# Patient Record
Sex: FEMALE | Race: WHITE | Employment: OTHER
[De-identification: names, ages, dates, MRNs, and addresses within clinical notes are randomized per-mention and may not be internally consistent; named-entity substitution may affect disease eponyms.]

---

## 2020-07-31 ENCOUNTER — TELEPHONE (OUTPATIENT)
Dept: CASE MANAGEMENT | Age: 65
End: 2020-07-31

## 2020-07-31 VITALS — WEIGHT: 165 LBS | HEIGHT: 67 IN | BODY MASS INDEX: 25.9 KG/M2

## 2020-07-31 NOTE — TELEPHONE ENCOUNTER
I called the patient and she confirmed her CT lung screening at [facility] on 8/3/2020 at 12:00 pm.  I reminded the patient to arrive at 11:30 am, enter through the main entrance, and register. Patient confirmed.           Electronically signed by Dominic Rosado on 7/31/20 at 2:34 PM EDT

## 2020-07-31 NOTE — TELEPHONE ENCOUNTER
No call, encounter opened just updating patient's height and weight.            Electronically signed by Lindy Salguero on 7/31/20 at 2:40 PM EDT

## 2020-08-03 ENCOUNTER — HOSPITAL ENCOUNTER (OUTPATIENT)
Dept: CT IMAGING | Age: 65
Discharge: HOME | End: 2020-08-05
Payer: MEDICARE

## 2020-08-03 PROCEDURE — G0297 LDCT FOR LUNG CA SCREEN: HCPCS

## 2020-08-04 ENCOUNTER — TELEPHONE (OUTPATIENT)
Dept: CASE MANAGEMENT | Age: 65
End: 2020-08-04

## 2020-08-04 NOTE — TELEPHONE ENCOUNTER
No call, encounter opened to process CT Lung Screening. CT Lung Screen: 8/3/2020  Impression    6.5 mm subpleural nodular opacity in the right upper lobe.         Lung RADS score: 3. (Probably benign)    RECOMMENDATION: Follow up with LDCT in 6 months. .      Pack years: 47    Social History     Tobacco Use  Smoking Status: Former Smoker    Start Date: 1972   Quit Date: 2008   Types: Cigarettes   Packs/Day: 1.50   Years: 36.00   Pack Years: 47   Smokeless Tobacco: Never Used         Results letter sent to patient via my chart or mailed.      [facility]

## 2020-08-24 ENCOUNTER — OFFICE VISIT (OUTPATIENT)
Dept: PHYSICAL MEDICINE AND REHAB | Age: 65
End: 2020-08-24
Payer: MEDICARE

## 2020-08-24 VITALS
HEIGHT: 67 IN | WEIGHT: 163 LBS | SYSTOLIC BLOOD PRESSURE: 106 MMHG | DIASTOLIC BLOOD PRESSURE: 68 MMHG | HEART RATE: 61 BPM | BODY MASS INDEX: 25.58 KG/M2

## 2020-08-24 PROCEDURE — 99204 OFFICE O/P NEW MOD 45 MIN: CPT | Performed by: PHYSICAL MEDICINE & REHABILITATION

## 2020-08-24 PROCEDURE — 3017F COLORECTAL CA SCREEN DOC REV: CPT | Performed by: PHYSICAL MEDICINE & REHABILITATION

## 2020-08-24 PROCEDURE — 4040F PNEUMOC VAC/ADMIN/RCVD: CPT | Performed by: PHYSICAL MEDICINE & REHABILITATION

## 2020-08-24 PROCEDURE — G8417 CALC BMI ABV UP PARAM F/U: HCPCS | Performed by: PHYSICAL MEDICINE & REHABILITATION

## 2020-08-24 PROCEDURE — 1123F ACP DISCUSS/DSCN MKR DOCD: CPT | Performed by: PHYSICAL MEDICINE & REHABILITATION

## 2020-08-24 PROCEDURE — 1090F PRES/ABSN URINE INCON ASSESS: CPT | Performed by: PHYSICAL MEDICINE & REHABILITATION

## 2020-08-24 PROCEDURE — G8427 DOCREV CUR MEDS BY ELIG CLIN: HCPCS | Performed by: PHYSICAL MEDICINE & REHABILITATION

## 2020-08-24 PROCEDURE — 1036F TOBACCO NON-USER: CPT | Performed by: PHYSICAL MEDICINE & REHABILITATION

## 2020-08-24 PROCEDURE — G8400 PT W/DXA NO RESULTS DOC: HCPCS | Performed by: PHYSICAL MEDICINE & REHABILITATION

## 2020-08-24 RX ORDER — GABAPENTIN 100 MG/1
100 CAPSULE ORAL 3 TIMES DAILY
COMMUNITY
Start: 2020-08-18

## 2020-08-24 RX ORDER — TRAZODONE HYDROCHLORIDE 100 MG/1
TABLET ORAL
COMMUNITY
Start: 2020-07-20

## 2020-08-24 RX ORDER — DULOXETIN HYDROCHLORIDE 60 MG/1
120 CAPSULE, DELAYED RELEASE ORAL DAILY
COMMUNITY
Start: 2020-07-20

## 2020-08-24 NOTE — PROGRESS NOTES
Nicciana Garza, [address] [facility] Physical Medicine and Rehabilitation  [address] [facility]. [facility] [facility] [place]  Phone: [phone]  Fax: [phone]    PCP: Papi Garcia DO  Date of visit: 8/24/20    Chief Complaint   Patient presents with    Lower Back Pain     New Patient    Leg Pain       Dear Dr. Jt Brown,     Thank you for referring your patient to be seen. As you know,  Renata Echeverria is a 71 y.o. female with past medical history as below who presents with bilateral leg pain and back pain for years, worse recently. There was a gradual onset of pain after no known injury. Now, the pain is intermittent and occurs daily while walking. The pain is rated Pain Score:   5, is described as achy, deep, and is located in bilateral calves. She also has low back pain that is radiating into the left thigh. Described as \"sciatic-like\" pain. Equates it to pain she had prior to her back surgery. The symptoms have been worse since onset. The pain is better with rest.  The pain is worse with walking. There is no associated numbness/tingling. There is weakness. There is no bowel/bladder changes. +poor balance. +near falls and tripping. The prior workup has included: none      The prior treatment has included:  PT: none   Chiropractic: none    Modalities: none   OTC Tylenol: none    NSAIDS: none   Opioids: none    Membrane stabilizers: neurontin 200mg PRN   cymbalta   Muscle relaxers: none    Previous injections: none    Previous surgery at this site: lumbar fusion- 2-3 years ago in [place]     No Known Allergies    Current Outpatient Medications   Medication Sig Dispense Refill    DULoxetine (CYMBALTA) 60 MG extended release capsule Take 120 mg by mouth daily      gabapentin (NEURONTIN) 100 MG capsule Take 100 mg by mouth 3 times daily.  traZODone (DESYREL) 100 MG tablet TAKE 1 TO 2 TABLETS BY MOUTH AT BEDTIME       No current facility-administered medications for this visit.         Past Medical History:   Diagnosis Date    Fibromyalgia        Past Surgical History:   Procedure Laterality Date    LUMBAR SPINE SURGERY         History reviewed. No pertinent family history. Social History     Tobacco Use    Smoking status: Former Smoker     Packs/day: 1.50     Years: 36.00     Pack years: 54.00     Types: Cigarettes     Start date:      Last attempt to quit: 2008     Years since quittin.6    Smokeless tobacco: Never Used   Substance Use Topics    Alcohol use: Not on file    Drug use: Not on file          Functional Status: The patient is able to ambulate and perform activities of daily living without the use of an assistive device. Occupation: The patient is currently shows dogs. ROS: For more complete ROS answered by the patient, please see . Constitutional: Denies fevers, chills, night sweats, unintentional weight loss     Skin: Denies rash or skin changes     Eyes: Denies vision changes    Ears/Nose/Throat: Denies nasal congestion or sore throat     Respiratory: Denies SOB or cough     Cardiovascular: Denies CP, palpitations, edema      Gastrointestinal: Denies abdominal pain,  N/V, constipation, or diarrhea    Genitourinary: Denies urinary symptoms    Neurologic: See HPI.     MSK: See HPI. Psychiatric: Denies sleep disturbance, anxiety, depression    Hematologic/Lymphatic/Immunologic: Denies bruising       Physical Exam:   Blood pressure 106/68, pulse 61, height 5' 7\" (1.702 m), weight 163 lb (73.9 kg). General: well developed and well nourished in no acute distress. Body habitus is non obese  HEENT: No rhinorrhea, sneezing, yawning, or lacrimation. No scleral icterus or conjunctival injection. Resp: symmetrical chest expansion, unlabored breathing, respirations unlabored. CV: Heart rate is regular. Peripheral pulses are palpable  Lymph: No visible regional lymphadenopathy. Skin: No rashes or ecchymosis. Normal turgor. Psych: Mood is calm. Affect is normal.   Ext: No edema noted     MSK:   Back/Hip Exam:   Inspection: Pelvis was asymmetric. Lumbar lordotic curvature was decreased. There was no scoliosis. No ecchymoses or erythema. Palpation: Palpatory exam revealed tenderness along lumbosacral paraspinals, midline spine, no ttp SI joint sulcus, greater trochanters and TFL on both side. There was paraspinal spasms. There were no trigger points. ROM: ROM decreased  Special/provocative testing:   SLR negative. No calf tenderness. Neurological Exam:  Strength:   R  L  Hip Flex  5  5  Knee Ext  4+ 4+  Ankle dorsi  4+ 4+  EHL   4+ 4+  Ankle Plantar  4+ 4+    Sensory:  Intact for light touch in all lower extremity dermatomes. Reflexes:   R  L  Patellar  (0) (0)  Ankle Jerk  (0) (0)        Gait is Antalgic. Unable to tandem gait. Difficulty with heel and toe walking. Imaging: (personally reviewed by me 08/24/20)  None     Impression:   Justo Avelar is a 71 y.o. female     1. Chronic bilateral low back pain with left-sided sciatica    2. Lumbar stenosis with neurogenic claudication    3. Radiculopathy, lumbosacral region        Plan:   · Lumbar x-ray and MRI L spine- history of back surgery, weakness. · Refer to PT. · Take neurontin TID not PRN.  The patient was educated about the diagnosis, prognosis, indications, risks and benefits of treatment. An opportunity to ask questions was given to the patient and questions were answered. The patient agreed to proceed with the recommended treatment as described above.  Follow up after PT/MRI. Discussed treatment options in future if needed included injections. Thank you for the consultation and for allowing me to participate in the care of this patient.         Sincerely,     Waldo Mcardle, DO, [facility]   Board Certified Physical Medicine and Rehabilitation

## 2020-09-05 ENCOUNTER — HOSPITAL ENCOUNTER (OUTPATIENT)
Dept: MRI IMAGING | Age: 65
Discharge: HOME | End: 2020-09-07
Payer: MEDICARE

## 2020-09-05 PROCEDURE — 72148 MRI LUMBAR SPINE W/O DYE: CPT

## 2020-09-08 ENCOUNTER — TELEPHONE (OUTPATIENT)
Dept: PHYSICAL MEDICINE AND REHAB | Age: 65
End: 2020-09-08

## 2020-09-08 NOTE — TELEPHONE ENCOUNTER
Called and left message on patient voicemail to call office back to schedule an appointment to go over MRI results.

## 2020-09-08 NOTE — TELEPHONE ENCOUNTER
----- Message from Mojgan Alberto DO sent at 9/8/2020  9:02 AM EDT -----  Please call patient to schedule appointment to review MRI. Nothing urgent.

## 2020-09-21 ENCOUNTER — OFFICE VISIT (OUTPATIENT)
Dept: PHYSICAL MEDICINE AND REHAB | Age: 65
End: 2020-09-21
Payer: MEDICARE

## 2020-09-21 VITALS
WEIGHT: 165 LBS | HEIGHT: 67 IN | HEART RATE: 72 BPM | SYSTOLIC BLOOD PRESSURE: 119 MMHG | DIASTOLIC BLOOD PRESSURE: 75 MMHG | BODY MASS INDEX: 25.9 KG/M2

## 2020-09-21 PROCEDURE — 99214 OFFICE O/P EST MOD 30 MIN: CPT | Performed by: PHYSICAL MEDICINE & REHABILITATION

## 2020-09-21 PROCEDURE — 3017F COLORECTAL CA SCREEN DOC REV: CPT | Performed by: PHYSICAL MEDICINE & REHABILITATION

## 2020-09-21 PROCEDURE — 4040F PNEUMOC VAC/ADMIN/RCVD: CPT | Performed by: PHYSICAL MEDICINE & REHABILITATION

## 2020-09-21 PROCEDURE — G8417 CALC BMI ABV UP PARAM F/U: HCPCS | Performed by: PHYSICAL MEDICINE & REHABILITATION

## 2020-09-21 PROCEDURE — G8400 PT W/DXA NO RESULTS DOC: HCPCS | Performed by: PHYSICAL MEDICINE & REHABILITATION

## 2020-09-21 PROCEDURE — 1123F ACP DISCUSS/DSCN MKR DOCD: CPT | Performed by: PHYSICAL MEDICINE & REHABILITATION

## 2020-09-21 PROCEDURE — 1036F TOBACCO NON-USER: CPT | Performed by: PHYSICAL MEDICINE & REHABILITATION

## 2020-09-21 PROCEDURE — G8427 DOCREV CUR MEDS BY ELIG CLIN: HCPCS | Performed by: PHYSICAL MEDICINE & REHABILITATION

## 2020-09-21 PROCEDURE — 1090F PRES/ABSN URINE INCON ASSESS: CPT | Performed by: PHYSICAL MEDICINE & REHABILITATION

## 2020-09-21 NOTE — PROGRESS NOTES
James Amos, [address] [facility] Physical Medicine and Rehabilitation  [address] [facility] [place]  Phone: [phone]  Fax: [phone]    PCP: Sarai Weinstein DO  Date of visit: 9/21/20    Chief Complaint   Patient presents with    Back Pain     follow up and results MRI     Interval:   Patient presents today for MRI results. MRI reveals moderate stenosis at L4-5, otherwise rather unremarkable to explain her severe foot pain. She reports continued bilateral foot and leg pain. The pain is rated Pain Score:   8, is described as achy, deep. She also has low back pain that is radiating into the left thigh. Described as \"sciatic-like\" pain. Equates it to pain she had prior to her back surgery. The pain is better with rest.  The pain is worse with walking. There is no associated numbness/tingling. There is weakness. There is no bowel/bladder changes. +poor balance. +near falls and tripping. Just started taking neurontin more regularly recently. The prior workup has included: MRI L spine       The prior treatment has included:  PT: referred last visit    Chiropractic: none    Modalities: none   OTC Tylenol: none    NSAIDS: none   Opioids: none    Membrane stabilizers: neurontin 200mg PRN   cymbalta   Muscle relaxers: none    Previous injections: none    Previous surgery at this site: lumbar fusion- 2-3 years ago in [place]     No Known Allergies    Current Outpatient Medications   Medication Sig Dispense Refill    DULoxetine (CYMBALTA) 60 MG extended release capsule Take 120 mg by mouth daily      gabapentin (NEURONTIN) 100 MG capsule Take 100 mg by mouth 3 times daily.  traZODone (DESYREL) 100 MG tablet TAKE 1 TO 2 TABLETS BY MOUTH AT BEDTIME       No current facility-administered medications for this visit.         Past Medical History:   Diagnosis Date    Fibromyalgia        Past Surgical History:   Procedure Laterality Date    LUMBAR SPINE SURGERY         History reviewed. No pertinent family history. Social History     Tobacco Use    Smoking status: Former Smoker     Packs/day: 1.50     Years: 36.00     Pack years: 54.00     Types: Cigarettes     Start date:      Last attempt to quit:      Years since quittin.7    Smokeless tobacco: Never Used   Substance Use Topics    Alcohol use: Not on file    Drug use: Not on file          Functional Status: The patient is able to ambulate and perform activities of daily living without the use of an assistive device. Occupation: The patient is currently shows dogs. ROS:    Constitutional: Denies fevers, chills, night sweats, unintentional weight loss     Skin: Denies rash or skin changes     Eyes: Denies vision changes    Ears/Nose/Throat: Denies nasal congestion or sore throat     Respiratory: Denies SOB or cough     Cardiovascular: Denies CP, palpitations, edema      Gastrointestinal: Denies abdominal pain,  N/V, constipation, or diarrhea    Genitourinary: Denies urinary symptoms    Neurologic: See HPI.     MSK: See HPI. Psychiatric: Denies sleep disturbance, anxiety, depression    Hematologic/Lymphatic/Immunologic: Denies bruising       Physical Exam:   Blood pressure 119/75, pulse 72, height 5' 7\" (1.702 m), weight 165 lb (74.8 kg). General: well developed and well nourished in no acute distress. Body habitus is non obese  HEENT: No rhinorrhea, sneezing, yawning, or lacrimation. No scleral icterus or conjunctival injection. Resp: symmetrical chest expansion, unlabored breathing, respirations unlabored. CV: Heart rate is regular. Peripheral pulses are palpable  Lymph: No visible regional lymphadenopathy. Skin: No rashes or ecchymosis. Normal turgor. Psych: Mood is calm. Affect is normal.   Ext: No edema noted     MSK:   Back/Hip Exam:   Inspection: Pelvis was asymmetric. Lumbar lordotic curvature was decreased. There was no scoliosis. No ecchymoses or erythema.    Palpation: Palpatory exam revealed tenderness along lumbosacral paraspinals, midline spine, no ttp SI joint sulcus, greater trochanters and TFL on both side. There was paraspinal spasms. There were no trigger points. ROM: ROM decreased  Special/provocative testing:   SLR negative. No calf tenderness. Neurological Exam:  Strength:   R  L  Hip Flex  5  5  Knee Ext  5- 5-  Ankle dorsi  5- 5-  EHL   5- 5-  Ankle Plantar  5- 5-    Sensory:  Intact for light touch in all lower extremity dermatomes. Reflexes:   R  L  Patellar  (0) (0)  Ankle Jerk  (0) (0)        Gait is Antalgic. Unable to tandem gait. Difficulty with heel and toe walking. Imaging: (personally reviewed by me 09/21/20)  MRI L spine     Impression:   Catarino Strauss is a 71 y.o. female     1. Bilateral foot pain        Plan:   · MRI L Spine reviewed- there is stenosis at L4-5 which could explain back and some of her leg symptoms, but findings don't correlate with the severe foot pain she complains of.   · I will order EMG to check for neuropathy. I explained to her that depending on EMG results, will likely refer her to neurology. We could do a diagnostic epidural as well. Will wait until after EMG. · Start PT       The patient was educated about the diagnosis, prognosis, indications, risks and benefits of treatment. An opportunity to ask questions was given to the patient and questions were answered. The patient agreed to proceed with the recommended treatment as described above.      Follow up at EMG      Coleman Francis DO, [address] [facility],  [address] Certified Physical Medicine and Rehabilitation

## 2020-09-29 ENCOUNTER — HOSPITAL ENCOUNTER (OUTPATIENT)
Dept: NEUROLOGY | Age: 65
Discharge: HOME | End: 2020-09-29
Payer: MEDICARE

## 2020-09-29 PROCEDURE — 95911 NRV CNDJ TEST 9-10 STUDIES: CPT

## 2020-09-29 PROCEDURE — 95886 MUSC TEST DONE W/N TEST COMP: CPT

## 2020-09-29 PROCEDURE — 95911 NRV CNDJ TEST 9-10 STUDIES: CPT | Performed by: PHYSICAL MEDICINE & REHABILITATION

## 2020-09-29 PROCEDURE — 95886 MUSC TEST DONE W/N TEST COMP: CPT | Performed by: PHYSICAL MEDICINE & REHABILITATION

## 2020-09-29 NOTE — PROCEDURES
[address] [facility]  [address] [facility], [address]  Phone: [phone]  Fax: [phone]      Referring Provider: Arti May*  Primary Care Physician: Emmanuel Gould DO  Patient Name: Giorgio Watts  Patient YOB: 1955  Gender: female  BMI: 25.84  5'7\"  160lbs.     9/29/2020    Description of clinical problem:   CC: bilateral foot pain    Pain Yes, Numbness/tingling  Yes; Weakness  No       Brief physical exam:   Office note reviewed. Patient examined. No changes. Motor NCS      Nerve / Sites Lat. Amplitude Distance Velocity Temp.    ms mV cm m/s °C   R Peroneal - EDB      Ankle 4.58 1.3 8  32.4      Fib head 11.98 1.4 34 46 32.4      Pop fossa 14.48 1.6 10 40 32.4        32.4   L Peroneal - EDB      Ankle 4.11 0.3 8  32.4      Fib head 12.76 0.2 35 40 32.4      Pop fossa 15.99 0.2 10 31 32.4   R Tibial - AH      Ankle 5.26 4.3 8  32.2      Pop fossa 16.51 1.5 48 43 32.3   L Tibial - AH      Ankle 4.90 1.3 8  32.2      Pop fossa 16.09 0.6 45 40 32.2       Sensory NCS      Nerve / Sites Peak Lat PP Amp Distance Velocity Temp. ms µV cm m/s °C   R Superficial peroneal - Ankle      Lat leg NR NR 10 NR 34.2   L Superficial peroneal - Ankle      Lat leg NR NR 10 NR 32.6   R Sural - Ankle (Calf)      Calf NR NR 14 NR 32.3   L Sural - Ankle (Calf)      Calf NR NR 14 NR 32.6       F  Wave      Nerve F Lat M Lat F-M Lat    ms ms ms   R Peroneal - EDB 59.0 0.1 59.0   R Tibial - AH 57.9 5.7 48.1   L Tibial - AH 59.3 1.8 57.5   L Peroneal - EDB NR NR NR       H Reflex      Nerve Lat Hmax    ms   R Tibial - Soleus 42.8   L Tibial - Soleus 38. 9       EMG         EMG Summary Table     Spontaneous MUAP Recruitment   Muscle IA Fib PSW Fasc H.F. Amp Dur. PPP Pattern   R. Tibialis anterior N None None None None N N 1+ Decr   R. Extensor hallucis longus N None None None None N N 1+ Decr   R.  Gastrocnemius (Medial head) N None None None None 1+ 1+ 1+ Decr   R. Vastus medialis N None None None None N N N N   R. Rectus femoris N None None None None N N N N      Study Limitations:  None     Summary of Findings:    1. Sensory nerve conduction studies of all nerves tested were absent. 2. Motor nerve conduction studies of the left peroneal nerve showed small amplitude and slow conduction velocity. All other nerves tested showed normal distal latencies, normal CMAP amplitudes, and normal conduction velocities. 3. F-wave studies of all nerves tested were abnormal.      4. Bilateral tibial nerve H-reflex responses were prolonged. 5. EMG was performed with monopolar needle in multiple muscles outlined above, including the right lumbar paraspinals. There were chronic neuropathic changes in all distal muscles tested. All other muscles tested revealed normal insertional activity, without evidence of abnormal spontaneous activity. All MUAP's were of normal amplitude and duration with a full recruitment pattern. No increase in polyphasic potentials was noted. Diagnostic Interpretation: This study was abnormal.     1. There is electrodiagnostic evidence of a Chronic mild to moderate symmetric length-dependent sensorimotor mixed peripheral polyneuropathy in bilateral lower extremities as questioned. Will refer to neurology for further work up. Previous Study: none       Follow up EMG is recommended if clinically indicated. Technologist: Ana Ansari    Physician: Jesse Weldon DO, [address] [place] [address]   Board Certified Physical Medicine and Rehabilitation      Nerve conduction studies and electromyography were performed according to our laboratory policies and procedures which can be provided upon request. All abnormal values are identified in the table.  Laboratory normal values can also be provided upon request.       Cc: Niecy Ng DO

## 2020-10-22 ENCOUNTER — OFFICE VISIT (OUTPATIENT)
Dept: NEUROLOGY | Age: 65
End: 2020-10-22
Payer: MEDICARE

## 2020-10-22 VITALS
OXYGEN SATURATION: 99 % | RESPIRATION RATE: 16 BRPM | SYSTOLIC BLOOD PRESSURE: 113 MMHG | WEIGHT: 168 LBS | TEMPERATURE: 98.4 F | HEART RATE: 84 BPM | BODY MASS INDEX: 26.37 KG/M2 | HEIGHT: 67 IN | DIASTOLIC BLOOD PRESSURE: 75 MMHG

## 2020-10-22 PROCEDURE — G8482 FLU IMMUNIZE ORDER/ADMIN: HCPCS | Performed by: PSYCHIATRY & NEUROLOGY

## 2020-10-22 PROCEDURE — G8427 DOCREV CUR MEDS BY ELIG CLIN: HCPCS | Performed by: PSYCHIATRY & NEUROLOGY

## 2020-10-22 PROCEDURE — 2022F DILAT RTA XM EVC RTNOPTHY: CPT | Performed by: PSYCHIATRY & NEUROLOGY

## 2020-10-22 PROCEDURE — 1090F PRES/ABSN URINE INCON ASSESS: CPT | Performed by: PSYCHIATRY & NEUROLOGY

## 2020-10-22 PROCEDURE — 99204 OFFICE O/P NEW MOD 45 MIN: CPT | Performed by: PSYCHIATRY & NEUROLOGY

## 2020-10-22 PROCEDURE — G8417 CALC BMI ABV UP PARAM F/U: HCPCS | Performed by: PSYCHIATRY & NEUROLOGY

## 2020-10-22 ASSESSMENT — ENCOUNTER SYMPTOMS
TROUBLE SWALLOWING: 0
PHOTOPHOBIA: 0
NAUSEA: 0
SHORTNESS OF BREATH: 0
VOMITING: 0

## 2020-10-22 NOTE — PROGRESS NOTES
NEUROLOGY NEW PATIENT NOTE     Date: 10/22/2020  Name: James Syed  MRN: [id number]  Patient's PCP: Harinder Zaragoza DO     Dear, Dr. Palak Ng DO    REASON FOR VISIT/CHIEF COMPLAINT: Neuropathy numbness tingling     HISTORY OF PRESENT ILLNESS: James Syed is a 71 y.o.  female past medical history fibromyalgia, horse accident, lumbar surgery, foraminal stenosis COPD is coming in for evaluation neuropathy. Onset: 6 to 8 months ago  Durahy axonal  tion: Ongoing  Location: Bilateral lower extremities and at times upper extremity. Characteristic: Patient reports pain in the bilateral thighs and spasms in the bilateral legs with numbness and tingling in bilateral feet and hands. Context: She works at [employer] and reports that numbness and tingling gets worse when she walks on her feet and stands for long time. Aggravating factors: Walking, standing  Relieving factors: None  H/O Diabetes: No  Etoh use : No  H/O Cancer: No  H/O exposure to heavy metals/Toxic: No substances:   H/O chemotherapy: No  H/O Nutritional deficiencies: No  Associated with medication use : No  Interfere with ADL's: Yes, affects her work at [employer]. Falls: No  Treatments Tried: Gabapentin 100 mg 3 times a day. EMG: Sensorimotor polyneuropat  I have personally reviewed her medical records    I have personally reviewed her EMG results.     PAST MEDICAL HISTORY:   Past Medical History:   Diagnosis Date    COPD (chronic obstructive pulmonary disease) ([facility] [address]     Fibromyalgia      PAST SURGICAL HISTORY:   Past Surgical History:   Procedure Laterality Date    LUMBAR SPINE SURGERY       FAMILY MEDICAL HISTORY:   Family History   Problem Relation Age of Onset    Cancer Father      SOCIAL HISTORY:   Social History     Socioeconomic History    Marital status: Single     Spouse name: None    Number of children: None    Years of education: None    Highest education level: None   Occupational History    None   Social Needs Negative for shortness of breath. Cardiovascular: Negative for palpitations. Gastrointestinal: Negative for nausea and vomiting. Endocrine: Negative for polyuria. Genitourinary: Negative for flank pain. Musculoskeletal: Negative for neck pain and neck stiffness. Skin: Negative for rash. Allergic/Immunologic: Negative for food allergies. Neurological: Positive for numbness. Negative for dizziness, tremors, seizures, syncope, speech difficulty, weakness, light-headedness and headaches. Hematological: Negative for adenopathy. Psychiatric/Behavioral: Negative for agitation, behavioral problems and sleep disturbance. PHYSICAL EXAM:   /75   Pulse 84   Temp 98.4 °F (36.9 °C) (Temporal)   Resp 16   Ht 5' 7\" (1.702 m)   Wt 168 lb (76.2 kg)   SpO2 99%   BMI 26.31 kg/m²   GENERAL APPEARANCE: Alert, well-developed, well-nourished female in no acute distress. HEENT: Normocephalic and atraumatic. PERRL. Oropharynx unremarkable. PULM: Normal respiratory effort. No accessory muscle use. CV: RRR. ABDOMEN: Soft, nontender. EXTREMITIES: No obvious signs of vascular compromise. Pulses present. No cyanosis, clubbing or edema. SKIN: Clear; no rashes, lesions or skin breaks in exposed areas. NEURO:     Neurological examination     MENTAL STATUS: Patient awake and oriented to time, place, and person. Recent/remote memory normal. Attention span/concentration normal. Speech fluent. Good comprehension, naming, and repetition. Fund of knowledge appropriate for patient's level of education. Affect normal.    CRANIAL NERVES:  CN I: Not tested. CN II: Fundoscopic exam not performed. CN III, IV, VI: Pupils equal, round and reactive to light; extra ocular movements full and intact. CN V: Facial sensation normal.  CN VII: No facial asymmetry. CN VIII:  Hearing grossly normal bilaterally. No pathologic nystagmus or skew deviation. CN IX, X: Palate elevates symmetrically.   CN XI: Shoulder shrug and chin rotation equal with intact strength. CN XII: Tongue protrusion midline. MOTOR: Normal bulk. Tone normal and symmetric throughout. Strength 5/5 throughout. ABNORMAL MOVEMENTS/TREMORS: No     REFLEXES: DTRs 2+; normal and symmetric throughout. Plantar response downgoing. SENSATION: Sensation grossly intact to fine touch, pain/temperature, vibration and position. COORDINATION: Finger-to-nose and heel to shin normal for age and symmetric. Finger tapping and alternating movements normal.    STATION: Negative Romberg. GAIT:  Normal heel, toe and tandem; no ataxia. Tinel sign positive on the right wrist.    Wasting of the right APB muscle    Right hand  4+/5. DIAGNOSTIC TESTS:     I have personally reviewed the most recent lab results:    No results found for: NA, K, CL, CO2, BUN, CREATININE, LABGLOM, CALCIUM, PHOS, MG  No results found for: WBC, HGB, HCT, PLT, NEUTOPHILPCT, MONOPCT, EOSPCT  No results found for: ALBUMIN, PROT, BILITOT, ALKPHOS, ALT, AST  No results found for: PTT, INR  No results found for: CHOLTOT, TRIG, HDL  No components found for: HGBA1C  No results found for: PROTEINCSF, GLUCCSF, WBCCSF    Controlled Substance Monitoring:    Acute and Chronic Pain Monitoring:   No flowsheet data found. MEDICAL DECISION MAKING  ASSESSMENT/PLAN    Cassandra Kraft was seen today for leg pain. Diagnoses and all orders for this visit:    Axonal sensorimotor polyneuropathy     Carpal tunnel syndrome    Lumbar foraminal stenosis    -     EMG; Future  -     Vitamin D 25 Hydroxy; Future  -     Vitamin B6; Future  -     Vitamin B12 & Folate; Future  -     Vitamin B1; Future  -     TSH without Reflex; Future  -     Sjogren's Ab (SS-A, SS-B); Future  -     Sedimentation Rate; Future  -     RPR Reflex to Titer and TPPA; Future  -     Rheumatoid Factor; Future  -     Electrophoresis Protein, Serum without Reflex to Immunofixation; Future  -     Lyme Disease Acute Reflexive Panel;  Future  - HIV Rapid 1&2; Future  -     Hemoglobin A1C; Future  -     C-Reactive Protein; Future  -     JASPREET; Future  · The patient has longstanding symptoms of leg pain and spasm with worsening of bilateral numbness and tingling. I suspect her underlying leg problems are due to a combination of her lumbar foraminal stenosis and her peripheral neuropathy. At this time the patient does not feel any relief from her gabapentin. She is taking 100 mg , 3 times a day. Consider increasing gabapentin to 300 mg 3 times a day. · She may also benefit from as needed Flexeril for her back and leg spasms. · Check peripheral neuropathy panel  · The numbness and tingling in the right hand is most likely secondary to underlying carpal tunnel syndrome. Check EMG of the bilateral upper extremities        Follow-up with Dr. Jaydon Shannon    Thank you for involving me in the care of your patient. Today, I personally spent a great amount of time directly face-to-face time with the patient, of which greater than 50% was spent in patient education, counseling,about etiology management and diagnosis of etiology management diagnosis of peripheral neuropathy, lumbar spinal stenosis, carpal tunnel syndrome. Side effects of medications including Neurontin, Cymbalta were discussed in detail with the patient, verbalizes understanding and agrees to it. And coordination of care as described above. Patient's current medication list, allergies, problem list and results of all previously ordered testing and scans were reviewed at today's visit.       MD KATHERINE Bennett [facility] Neurology  [address], [place]

## 2020-10-29 ENCOUNTER — HOSPITAL ENCOUNTER (OUTPATIENT)
Dept: NEUROLOGY | Age: 65
Discharge: HOME | End: 2020-10-29
Payer: MEDICARE

## 2020-10-29 VITALS — BODY MASS INDEX: 25.9 KG/M2 | WEIGHT: 165 LBS | HEIGHT: 67 IN

## 2020-10-29 PROCEDURE — 95886 MUSC TEST DONE W/N TEST COMP: CPT

## 2020-10-29 PROCEDURE — 95885 MUSC TST DONE W/NERV TST LIM: CPT | Performed by: PHYSICAL MEDICINE & REHABILITATION

## 2020-10-29 PROCEDURE — 95910 NRV CNDJ TEST 7-8 STUDIES: CPT | Performed by: PHYSICAL MEDICINE & REHABILITATION

## 2020-10-29 PROCEDURE — 95885 MUSC TST DONE W/NERV TST LIM: CPT

## 2020-10-29 PROCEDURE — 95886 MUSC TEST DONE W/N TEST COMP: CPT | Performed by: PHYSICAL MEDICINE & REHABILITATION

## 2020-10-29 PROCEDURE — 95912 NRV CNDJ TEST 11-12 STUDIES: CPT

## 2020-10-29 NOTE — PROCEDURES
Median Ring 3.54 6.6 9.9 Median palm - Ulnar palm 0.36 35      Ulnar Ring 3.13 6.3 4.7         Median palm Wrist 2.40 14.7 19.3         Ulnar palm Wrist 2.03 8.6 8.2         CSI     CSI 1.15          F  Wave      Nerve F Lat M Lat F-M Lat    ms ms ms   R Median - APB 31.6 4.2 27.3   R Ulnar - ADM 30.4 2.1 28.3   L Median - APB 29.8 3.7 26.1       EMG         EMG Summary Table     Spontaneous MUAP Recruitment   Muscle IA Fib PSW Fasc H.F. Amp Dur. PPP Pattern   R. Deltoid N None None None None N N N N   R. Biceps brachii N None None None None N N N N   R. Triceps brachii N None None None None N N N N   R. Pronator teres N None None None None N N N N   R. Flexor carpi ulnaris N None None None None 1+ N N Sl Decr   R. First dorsal interosseous N None None None None 1+ N N N   R. Abductor pollicis brevis N None None None None N N N N   R. Cervical paraspinals (mid) N None None None None       R. Cervical paraspinals (low) N None None None None       L. Abductor pollicis brevis N None None None None N N N N         Summary of Findings:   Nerve conduction studies:   Sensory nerve conduction study of the right median nerve revealed delayed distal latency and normal SNAP amplitude. Sensory nerve conduction studies of the left median, right ulnar, and right radial nerves showed normal distal latencies and normal SNAP amplitudes. A combined sensory index-- comparing the median nerve with the ulnar nerve (orthodromically and antidromically) and with the radial nerve (antidromically)-- showed a comparative latency prolongation of 1.15ms for the left median nerve, which is a positive test (cutoff for normal is a combined latency prolongation of 0.9 ms). This test increases sensitivity of detection of median nerve mononeuropathy. Motor nerve conduction study of the right ulnar nerve revealed normal distal latency, decreased CMAP amplitude, and normal conduction velocity. Motor nerve conduction studies of the bilateral median nerves showed normal distal latencies, normal CMAP amplitudes, and normal conduction velocities. F-wave studies of the bilateral median and right ulnar nerves revealed normal latencies. Needle EMG:   · Needle EMG was performed using a monopolar needle. · The following abnormalities were seen on needle EMG: Chronic neuropathic changes were noted in the right flexor carpi ulnaris and first dorsal interosseous. All other muscles tested, as listed in the table above, demonstrated normal amplitude, duration, phases, and recruitment, without evidence of active denervation. Diagnostic Interpretation: This study was Abnormal.     1. There is electrodiagnostic evidence of focal median mononeuropathies at or about the wrists bilaterally, mild in degree, affecting sensory fibers, and demyelinating in nature. This is consistent with a clinical diagnosis of bilateral carpal tunnel syndrome. 2. There is electrodiagnostic evidence of a focal ulnar mononeuropathy proximal to the innervation of the flexor carpi ulnaris on the right. The most likely point of entrapment is at the elbow. Chronic neuropathic changes were noted on needle exam; there was no evidence of acute denervation. 3. There is no electrodiagnostic evidence of any other peripheral nerve mononeuropathy, plexopathy, or cervical motor radiculopathy in the right upper extremity. Cannot evaluate for pure sensory radiculopathy or small fiber neuropathy by electrodiagnostic technique. Previous Study: No prior upper extremity study available       Follow up EMG can be completed in the future if clinically indicated.      Technologist: Rui Brito    Physician:  Randolph Dee MD  Physical Medicine and Rehabilitation Nerve conduction studies and electromyography were performed according to our laboratory policies and procedures which can be provided upon request. All abnormal values are identified in the table.  Laboratory normal values can also be provided upon request.       Cc: MD Florencia Rios DO

## 2020-12-03 ENCOUNTER — TELEPHONE (OUTPATIENT)
Dept: NEUROLOGY | Age: 65
End: 2020-12-03

## 2020-12-15 ENCOUNTER — TELEPHONE (OUTPATIENT)
Dept: NEUROLOGY | Age: 65
End: 2020-12-15

## 2021-10-25 ENCOUNTER — HOSPITAL ENCOUNTER (OUTPATIENT)
Dept: CT IMAGING | Age: 66
Discharge: HOME | End: 2021-10-27
Payer: MEDICARE

## 2021-10-25 DIAGNOSIS — R91.1 PULMONARY NODULE: ICD-10-CM

## 2021-10-25 PROCEDURE — 71250 CT THORAX DX C-: CPT

## 2022-02-17 ENCOUNTER — HOSPITAL ENCOUNTER (EMERGENCY)
Age: 67
Discharge: HOME | End: 2022-02-17
Attending: STUDENT IN AN ORGANIZED HEALTH CARE EDUCATION/TRAINING PROGRAM
Payer: MEDICARE

## 2022-02-17 ENCOUNTER — APPOINTMENT (OUTPATIENT)
Dept: GENERAL RADIOLOGY | Age: 67
End: 2022-02-17
Payer: MEDICARE

## 2022-02-17 VITALS
TEMPERATURE: 98.2 F | DIASTOLIC BLOOD PRESSURE: 68 MMHG | HEART RATE: 78 BPM | OXYGEN SATURATION: 91 % | RESPIRATION RATE: 18 BRPM | SYSTOLIC BLOOD PRESSURE: 108 MMHG

## 2022-02-17 DIAGNOSIS — J44.1 COPD EXACERBATION (HCC): Primary | ICD-10-CM

## 2022-02-17 DIAGNOSIS — R06.00 DYSPNEA, UNSPECIFIED TYPE: ICD-10-CM

## 2022-02-17 LAB
ALBUMIN SERPL-MCNC: 4 G/DL (ref 3.5–5.2)
ALP BLD-CCNC: 98 U/L (ref 35–104)
ALT SERPL-CCNC: 10 U/L (ref 0–32)
ANION GAP SERPL CALCULATED.3IONS-SCNC: 13 MMOL/L (ref 7–16)
AST SERPL-CCNC: 17 U/L (ref 0–31)
BASOPHILS ABSOLUTE: 0.02 E9/L (ref 0–0.2)
BASOPHILS RELATIVE PERCENT: 0.4 % (ref 0–2)
BILIRUB SERPL-MCNC: 0.5 MG/DL (ref 0–1.2)
BUN BLDV-MCNC: 10 MG/DL (ref 6–23)
CALCIUM SERPL-MCNC: 9.3 MG/DL (ref 8.6–10.2)
CHLORIDE BLD-SCNC: 100 MMOL/L (ref 98–107)
CO2: 26 MMOL/L (ref 22–29)
CREAT SERPL-MCNC: 0.8 MG/DL (ref 0.5–1)
D DIMER: <200 NG/ML DDU
EKG ATRIAL RATE: 66 BPM
EKG P AXIS: 76 DEGREES
EKG P-R INTERVAL: 152 MS
EKG Q-T INTERVAL: 416 MS
EKG QRS DURATION: 84 MS
EKG QTC CALCULATION (BAZETT): 436 MS
EKG R AXIS: 2 DEGREES
EKG T AXIS: 53 DEGREES
EKG VENTRICULAR RATE: 66 BPM
EOSINOPHILS ABSOLUTE: 0.25 E9/L (ref 0.05–0.5)
EOSINOPHILS RELATIVE PERCENT: 5 % (ref 0–6)
GFR AFRICAN AMERICAN: >60
GFR NON-AFRICAN AMERICAN: >60 ML/MIN/1.73
GLUCOSE BLD-MCNC: 88 MG/DL (ref 74–99)
HCT VFR BLD CALC: 40.2 % (ref 34–48)
HEMOGLOBIN: 13.3 G/DL (ref 11.5–15.5)
IMMATURE GRANULOCYTES #: 0.01 E9/L
IMMATURE GRANULOCYTES %: 0.2 % (ref 0–5)
LYMPHOCYTES ABSOLUTE: 0.79 E9/L (ref 1.5–4)
LYMPHOCYTES RELATIVE PERCENT: 15.6 % (ref 20–42)
MCH RBC QN AUTO: 29.5 PG (ref 26–35)
MCHC RBC AUTO-ENTMCNC: 33.1 % (ref 32–34.5)
MCV RBC AUTO: 89.1 FL (ref 80–99.9)
MONOCYTES ABSOLUTE: 0.43 E9/L (ref 0.1–0.95)
MONOCYTES RELATIVE PERCENT: 8.5 % (ref 2–12)
NEUTROPHILS ABSOLUTE: 3.55 E9/L (ref 1.8–7.3)
NEUTROPHILS RELATIVE PERCENT: 70.3 % (ref 43–80)
PDW BLD-RTO: 13.2 FL (ref 11.5–15)
PLATELET # BLD: 167 E9/L (ref 130–450)
PMV BLD AUTO: 8.9 FL (ref 7–12)
POTASSIUM SERPL-SCNC: 3.7 MMOL/L (ref 3.5–5)
PRO-BNP: 67 PG/ML (ref 0–125)
RBC # BLD: 4.51 E12/L (ref 3.5–5.5)
SARS-COV-2, NAAT: NOT DETECTED
SODIUM BLD-SCNC: 139 MMOL/L (ref 132–146)
TOTAL PROTEIN: 7.1 G/DL (ref 6.4–8.3)
TROPONIN, HIGH SENSITIVITY: 10 NG/L (ref 0–9)
TROPONIN, HIGH SENSITIVITY: 10 NG/L (ref 0–9)
WBC # BLD: 5.1 E9/L (ref 4.5–11.5)

## 2022-02-17 PROCEDURE — 71046 X-RAY EXAM CHEST 2 VIEWS: CPT

## 2022-02-17 PROCEDURE — 93010 ELECTROCARDIOGRAM REPORT: CPT | Performed by: INTERNAL MEDICINE

## 2022-02-17 PROCEDURE — 84484 ASSAY OF TROPONIN QUANT: CPT

## 2022-02-17 PROCEDURE — 85025 COMPLETE CBC W/AUTO DIFF WBC: CPT

## 2022-02-17 PROCEDURE — 6370000000 HC RX 637 (ALT 250 FOR IP): Performed by: STUDENT IN AN ORGANIZED HEALTH CARE EDUCATION/TRAINING PROGRAM

## 2022-02-17 PROCEDURE — 83880 ASSAY OF NATRIURETIC PEPTIDE: CPT

## 2022-02-17 PROCEDURE — 36415 COLL VENOUS BLD VENIPUNCTURE: CPT

## 2022-02-17 PROCEDURE — 94640 AIRWAY INHALATION TREATMENT: CPT

## 2022-02-17 PROCEDURE — 93005 ELECTROCARDIOGRAM TRACING: CPT | Performed by: PHYSICIAN ASSISTANT

## 2022-02-17 PROCEDURE — 87635 SARS-COV-2 COVID-19 AMP PRB: CPT

## 2022-02-17 PROCEDURE — 80053 COMPREHEN METABOLIC PANEL: CPT

## 2022-02-17 PROCEDURE — 94664 DEMO&/EVAL PT USE INHALER: CPT

## 2022-02-17 PROCEDURE — 99284 EMERGENCY DEPT VISIT MOD MDM: CPT

## 2022-02-17 PROCEDURE — 85378 FIBRIN DEGRADE SEMIQUANT: CPT

## 2022-02-17 RX ORDER — IPRATROPIUM BROMIDE AND ALBUTEROL SULFATE 2.5; .5 MG/3ML; MG/3ML
1 SOLUTION RESPIRATORY (INHALATION)
Status: DISCONTINUED | OUTPATIENT
Start: 2022-02-17 | End: 2022-02-17

## 2022-02-17 RX ORDER — PREDNISONE 20 MG/1
50 TABLET ORAL ONCE
Status: COMPLETED | OUTPATIENT
Start: 2022-02-17 | End: 2022-02-17

## 2022-02-17 RX ORDER — IPRATROPIUM BROMIDE AND ALBUTEROL SULFATE 2.5; .5 MG/3ML; MG/3ML
3 SOLUTION RESPIRATORY (INHALATION) ONCE
Status: COMPLETED | OUTPATIENT
Start: 2022-02-17 | End: 2022-02-17

## 2022-02-17 RX ORDER — PREDNISONE 50 MG/1
50 TABLET ORAL DAILY
Qty: 4 TABLET | Refills: 0 | Status: SHIPPED | OUTPATIENT
Start: 2022-02-18 | End: 2022-02-22

## 2022-02-17 RX ADMIN — IPRATROPIUM BROMIDE AND ALBUTEROL SULFATE 3 AMPULE: 2.5; .5 SOLUTION RESPIRATORY (INHALATION) at 13:51

## 2022-02-17 RX ADMIN — PREDNISONE 50 MG: 20 TABLET ORAL at 13:21

## 2022-02-17 ASSESSMENT — ENCOUNTER SYMPTOMS
BACK PAIN: 0
ABDOMINAL PAIN: 0
VOMITING: 0
NAUSEA: 0
CHEST TIGHTNESS: 0
COUGH: 1
SORE THROAT: 0
SHORTNESS OF BREATH: 1
ABDOMINAL DISTENTION: 0
DIARRHEA: 0

## 2022-02-17 NOTE — ED PROVIDER NOTES
HPI     Patient is a 78 y.o. female presents with a chief complaint of shortness of breath  This has been occurring for one month. Patient states that it gets better with nothing  Patient states that it gets worse with time. Patient states that it is severe in severity. Patient states it was gradual in onset. Patient presents with abdominal pain. Patient stated that this started approximately a month ago and she was told that she might have COPD. Patient also notes that she has been having a significant increase in her cough. Patient has not making any sputum. Patient has no fevers or chills. Patient states that this has slowly gotten worse but worse over the past 3 days. Patient is scheduled to see a pulmonologist next week. Patient was vaccinated for COVID. Patient does note that she traveled to [place] a month ago. Patient has had no leg swelling. Patient has never had a blood clot in the past.  Patient denies any fevers, chills, nausea, vomiting, abdominal pain, change in urinary or bowel habits. Patient is denying any chest pain. Review of Systems   Constitutional: Negative for activity change, appetite change, chills, fatigue and fever. HENT: Negative for congestion, drooling and sore throat. Respiratory: Positive for cough and shortness of breath. Negative for chest tightness. Cardiovascular: Negative for chest pain and palpitations. Gastrointestinal: Negative for abdominal distention, abdominal pain, diarrhea, nausea and vomiting. Genitourinary: Negative for decreased urine volume, difficulty urinating, enuresis, flank pain, frequency and hematuria. Musculoskeletal: Negative for arthralgias, back pain and neck stiffness. Skin: Negative for rash and wound. Neurological: Negative for dizziness, facial asymmetry, light-headedness and headaches. Psychiatric/Behavioral: Negative for agitation, confusion and decreased concentration.         Physical Exam  Vitals and nursing note reviewed. Constitutional:       Appearance: She is well-developed. She is not ill-appearing or toxic-appearing. HENT:      Head: Normocephalic and atraumatic. Eyes:      Conjunctiva/sclera: Conjunctivae normal.   Cardiovascular:      Rate and Rhythm: Normal rate and regular rhythm. Heart sounds: Normal heart sounds. No murmur heard. Pulmonary:      Effort: Pulmonary effort is normal. No respiratory distress. Breath sounds: Rales present. No wheezing. Comments: Diminished lung sounds bilaterally with crackles at the bases. Abdominal:      General: Bowel sounds are normal.      Palpations: Abdomen is soft. Tenderness: There is no abdominal tenderness. There is no guarding or rebound. Musculoskeletal:      Cervical back: Normal range of motion and neck supple. Skin:     General: Skin is warm and dry. Neurological:      Mental Status: She is alert and oriented to person, place, and time. Cranial Nerves: No cranial nerve deficit. Coordination: Coordination normal.          Procedures     MDM     ED Course as of 02/17/22 1813   Thu Feb 17, 2022   1357 ATTENDING PROVIDER ATTESTATION:     I have personally performed and/or participated in the history, exam, medical decision making, and procedures and agree with all pertinent clinical information unless otherwise noted. I have also reviewed and agree with the past medical, family and social history unless otherwise noted. I have discussed this patient in detail with the resident, and provided the instruction and education regarding the patient. My findings/plan: 79-year-old female with history of neuropathy, COPD, fibromyalgia presenting for evaluation of shortness of breath. Notes this is been getting ongoing for quite some time now but getting progressively worse. Notes that during this week she would wake up with sweats in the middle of the night. Notes some congestion and phlegm as well. Denies any chest discomfort. States that she is feeling more chest heaviness, especially when her cat sits on her chest and feels like she cannot grasp her breath. She is lying in bed no acute distress. She is not hypoxic, heart regular rate and rhythmLungs with crackles in the bilateral basesPlan for labs, imaging, supportive care. [BB]      ED Course User Index  [BB] Andreia Douglas DO      Patient is a 78 y.o. female presenting with shortness of breath. Patient is diagnosed with COPD. Patient feels that this is related to her COPD. Patient will be given duo nebs and prednisone. Patient has crackles in the bilateral lung bases. Patient labs drawn. Patient with Covid test.  Covid test is negative. Patient was ambulated. Patient did not desat while she ambulated. Patient had a chest x-ray that was benign. Patient had no increasing of her sputum. Patient was nonhypoxic on room time. Patient's lab work is otherwise benign. Patient will be discharged home with prednisone for possible COPD exacerbation. Patient follow-up with her pulmonologist.    Patient was given return precautions. Patient will follow up with their primary care provider. Patient is agreeable to this plan. Patient has remained stable throughout their stay in the ED. Patient was seen and evaluated by myself and my attending Andreia Douglas DO. Assessment and Plan discussed with attending provider, please see attestation for final plan of care. This note was done using dictation software and there may be some grammatical errors associated with this. Jhony Murray MD         ED Course as of 02/17/22 1816   u Feb 17, 2022   1357 ATTENDING PROVIDER ATTESTATION:     I have personally performed and/or participated in the history, exam, medical decision making, and procedures and agree with all pertinent clinical information unless otherwise noted.       I have also reviewed and agree with the past medical, family and social history unless otherwise noted. I have discussed this patient in detail with the resident, and provided the instruction and education regarding the patient. My findings/plan: 48-year-old female with history of neuropathy, COPD, fibromyalgia presenting for evaluation of shortness of breath. Notes this is been getting ongoing for quite some time now but getting progressively worse. Notes that during this week she would wake up with sweats in the middle of the night. Notes some congestion and phlegm as well. Denies any chest discomfort. States that she is feeling more chest heaviness, especially when her cat sits on her chest and feels like she cannot grasp her breath. She is lying in bed no acute distress. She is not hypoxic, heart regular rate and rhythmLungs with crackles in the bilateral basesPlan for labs, imaging, supportive care. [BB]      ED Course User Index  [BB] Chaparritatong Villegas, DO       --------------------------------------------- PAST HISTORY ---------------------------------------------  Past Medical History:  has a past medical history of COPD (chronic obstructive pulmonary disease) (HCC) and Fibromyalgia. Past Surgical History:  has a past surgical history that includes Lumbar spine surgery. Social History:  reports that she quit smoking about 14 years ago. Her smoking use included cigarettes. She started smoking about 50 years ago. She has a 54.00 pack-year smoking history. She has never used smokeless tobacco. She reports previous alcohol use. She reports that she does not use drugs. Family History: family history includes Cancer in her father. The patients home medications have been reviewed. Allergies: Patient has no known allergies.     -------------------------------------------------- RESULTS -------------------------------------------------  Labs:  Results for orders placed or performed during the hospital encounter of 02/17/22   COVID-19, Rapid    Specimen: Nasopharyngeal Swab Result Value Ref Range    SARS-CoV-2, NAAT Not Detected Not Detected   CBC with Auto Differential   Result Value Ref Range    WBC 5.1 4.5 - 11.5 E9/L    RBC 4.51 3.50 - 5.50 E12/L    Hemoglobin 13.3 11.5 - 15.5 g/dL    Hematocrit 40.2 34.0 - 48.0 %    MCV 89.1 80.0 - 99.9 fL    MCH 29.5 26.0 - 35.0 pg    MCHC 33.1 32.0 - 34.5 %    RDW 13.2 11.5 - 15.0 fL    Platelets 706 337 - 872 E9/L    MPV 8.9 7.0 - 12.0 fL    Neutrophils % 70.3 43.0 - 80.0 %    Immature Granulocytes % 0.2 0.0 - 5.0 %    Lymphocytes % 15.6 (L) 20.0 - 42.0 %    Monocytes % 8.5 2.0 - 12.0 %    Eosinophils % 5.0 0.0 - 6.0 %    Basophils % 0.4 0.0 - 2.0 %    Neutrophils Absolute 3.55 1.80 - 7.30 E9/L    Immature Granulocytes # 0.01 E9/L    Lymphocytes Absolute 0.79 (L) 1.50 - 4.00 E9/L    Monocytes Absolute 0.43 0.10 - 0.95 E9/L    Eosinophils Absolute 0.25 0.05 - 0.50 E9/L    Basophils Absolute 0.02 0.00 - 0.20 E9/L   Comprehensive Metabolic Panel   Result Value Ref Range    Sodium 139 132 - 146 mmol/L    Potassium 3.7 3.5 - 5.0 mmol/L    Chloride 100 98 - 107 mmol/L    CO2 26 22 - 29 mmol/L    Anion Gap 13 7 - 16 mmol/L    Glucose 88 74 - 99 mg/dL    BUN 10 6 - 23 mg/dL    CREATININE 0.8 0.5 - 1.0 mg/dL    GFR Non-African American >60 >=60 mL/min/1.73    GFR African American >60     Calcium 9.3 8.6 - 10.2 mg/dL    Total Protein 7.1 6.4 - 8.3 g/dL    Albumin 4.0 3.5 - 5.2 g/dL    Total Bilirubin 0.5 0.0 - 1.2 mg/dL    Alkaline Phosphatase 98 35 - 104 U/L    ALT 10 0 - 32 U/L    AST 17 0 - 31 U/L   Troponin   Result Value Ref Range    Troponin, High Sensitivity 10 (H) 0 - 9 ng/L   Brain Natriuretic Peptide   Result Value Ref Range    Pro-BNP 67 0 - 125 pg/mL   D-Dimer, Quantitative   Result Value Ref Range    D-Dimer, Quant <200 ng/mL DDU   Troponin   Result Value Ref Range    Troponin, High Sensitivity 10 (H) 0 - 9 ng/L   EKG 12 Lead   Result Value Ref Range    Ventricular Rate 66 BPM    Atrial Rate 66 BPM    P-R Interval 152 ms    QRS Duration 84

## 2022-02-17 NOTE — ED NOTES
Discharge orders explained to pt. IV removed and pressure held to site for 1 minute. Pt verbalized understanding of new prescription and purpose. Pt ambulated independently to exit.      Shabnam Madrigal RN  02/17/22 [id number]

## 2022-02-17 NOTE — ED NOTES
Department of Emergency Medicine  FIRST PROVIDER TRIAGE NOTE             Independent MLP           2/17/22  12:17 PM EST    Date of Encounter: 2/17/22   MRN: [id number]      HPI: Guicho Bush is a 78 y.o. female who presents to the ED for Shortness of Breath (SOB x1 month with worsening over the \"last few days but it's better today than it was yesterday\", hx COPD, SpO2 99% RA at [facility])     Patient is a six 8year-old is presenting with persistent intermittent shortness of breath. Patient states that she is completely fine at rest is with exertion. Patient states over the weekend she lost her inhaler and did not think it really worked but Monday her symptoms started progressed and thinks it maybe it was working. Patient is 99% on room air. Patient states that she does have a history of COPD. Patient feels very congested and states she took a week worth of Mucinex which did nothing. ROS: Negative for abd pain, back pain, vomiting, diarrhea, urinary complaints, rash, headache or dizziness. PE: Gen Appearance/Constitutional: alert  HEENT: NC/NT. PERRLA,  Airway patent. Neck: supple     Initial Plan of Care: All treatment areas with department are currently occupied. Plan to order/Initiate the following while awaiting opening in ED: labs, EKG and imaging studies.   Initiate Treatment-Testing, Proceed toTreatment Area When Bed Available for ED Attending/MLP to Continue Care    Electronically signed by Guerrero Lundberg PA-C   DD: 2/17/22         Guerrero Lundberg PA-C  02/17/22 [id number]

## 2022-02-17 NOTE — ED NOTES
Pt tolerated ambulation well, denies SOB while walking with this RN. O2 Sat 96% on room air during ambulation.      Francine Pratt RN  02/17/22 [id number]

## 2022-11-07 ENCOUNTER — HOSPITAL ENCOUNTER (OUTPATIENT)
Dept: CT IMAGING | Age: 67
Discharge: HOME | End: 2022-11-09
Payer: MEDICARE

## 2022-11-07 DIAGNOSIS — R91.1 LUNG NODULE: ICD-10-CM

## 2022-11-07 PROCEDURE — 71250 CT THORAX DX C-: CPT

## 2023-02-14 ENCOUNTER — HOSPITAL ENCOUNTER (OUTPATIENT)
Age: 68
Discharge: HOME | End: 2023-02-16

## 2023-06-22 ENCOUNTER — APPOINTMENT (OUTPATIENT)
Dept: CT IMAGING | Age: 68
End: 2023-06-22
Payer: MEDICARE

## 2023-06-22 ENCOUNTER — HOSPITAL ENCOUNTER (OUTPATIENT)
Age: 68
Setting detail: OBSERVATION
Discharge: HOME | End: 2023-06-23
Attending: EMERGENCY MEDICINE | Admitting: INTERNAL MEDICINE
Payer: MEDICARE

## 2023-06-22 ENCOUNTER — APPOINTMENT (OUTPATIENT)
Dept: GENERAL RADIOLOGY | Age: 68
End: 2023-06-22
Payer: MEDICARE

## 2023-06-22 DIAGNOSIS — R55 SYNCOPE, UNSPECIFIED SYNCOPE TYPE: Primary | ICD-10-CM

## 2023-06-22 DIAGNOSIS — R27.0 ATAXIA: ICD-10-CM

## 2023-06-22 LAB
ALBUMIN SERPL-MCNC: 4.1 G/DL (ref 3.5–5.2)
ALP SERPL-CCNC: 91 U/L (ref 35–104)
ALT SERPL-CCNC: 12 U/L (ref 5–33)
ANION GAP SERPL CALCULATED.3IONS-SCNC: 10 MMOL/L (ref 9–17)
AST SERPL-CCNC: 19 U/L
BASOPHILS # BLD: 0 K/UL (ref 0–0.2)
BASOPHILS NFR BLD: 1 % (ref 0–2)
BILIRUB SERPL-MCNC: 0.4 MG/DL (ref 0.3–1.2)
BUN SERPL-MCNC: 11 MG/DL (ref 8–23)
BUN/CREAT SERPL: 19 (ref 9–20)
CALCIUM SERPL-MCNC: 9 MG/DL (ref 8.6–10.4)
CHLORIDE SERPL-SCNC: 101 MMOL/L (ref 98–107)
CO2 SERPL-SCNC: 31 MMOL/L (ref 20–31)
CREAT SERPL-MCNC: 0.59 MG/DL (ref 0.5–0.9)
DIFFERENTIAL TYPE: YES
EOSINOPHIL # BLD: 0.2 K/UL (ref 0–0.4)
EOSINOPHILS RELATIVE PERCENT: 4 % (ref 0–5)
ERYTHROCYTE [DISTWIDTH] IN BLOOD BY AUTOMATED COUNT: 13.4 % (ref 12.1–15.2)
GFR SERPL CREATININE-BSD FRML MDRD: >60 ML/MIN/1.73M2
GLUCOSE SERPL-MCNC: 88 MG/DL (ref 70–99)
HCT VFR BLD AUTO: 38 % (ref 36–46)
HGB BLD-MCNC: 12.8 G/DL (ref 12–16)
LYMPHOCYTES # BLD: 21 % (ref 15–40)
LYMPHOCYTES NFR BLD: 0.9 K/UL (ref 1–4.8)
MCH RBC QN AUTO: 30.8 PG (ref 26–34)
MCHC RBC AUTO-ENTMCNC: 33.6 G/DL (ref 31–37)
MCV RBC AUTO: 91.6 FL (ref 80–100)
MONOCYTES NFR BLD: 0.3 K/UL (ref 0–1)
MONOCYTES NFR BLD: 7 % (ref 4–8)
NEUTROPHILS NFR BLD: 67 % (ref 47–75)
NEUTS SEG NFR BLD: 2.7 K/UL (ref 2.5–7)
PLATELET # BLD AUTO: 196 K/UL (ref 140–450)
POTASSIUM SERPL-SCNC: 3.2 MMOL/L (ref 3.7–5.3)
PROT SERPL-MCNC: 7.3 G/DL (ref 6.4–8.3)
RBC # BLD AUTO: 4.14 M/UL (ref 4–5.2)
SODIUM SERPL-SCNC: 142 MMOL/L (ref 135–144)
TROPONIN I SERPL HS-MCNC: 9 NG/L (ref 0–14)
WBC OTHER # BLD: 4.1 K/UL (ref 3.5–11)

## 2023-06-22 PROCEDURE — 6370000000 HC RX 637 (ALT 250 FOR IP): Performed by: INTERNAL MEDICINE

## 2023-06-22 PROCEDURE — 71045 X-RAY EXAM CHEST 1 VIEW: CPT

## 2023-06-22 PROCEDURE — 6360000004 HC RX CONTRAST MEDICATION: Performed by: EMERGENCY MEDICINE

## 2023-06-22 PROCEDURE — 6370000000 HC RX 637 (ALT 250 FOR IP): Performed by: EMERGENCY MEDICINE

## 2023-06-22 PROCEDURE — 84484 ASSAY OF TROPONIN QUANT: CPT

## 2023-06-22 PROCEDURE — 2580000003 HC RX 258: Performed by: INTERNAL MEDICINE

## 2023-06-22 PROCEDURE — 96360 HYDRATION IV INFUSION INIT: CPT

## 2023-06-22 PROCEDURE — 96372 THER/PROPH/DIAG INJ SC/IM: CPT

## 2023-06-22 PROCEDURE — 94761 N-INVAS EAR/PLS OXIMETRY MLT: CPT

## 2023-06-22 PROCEDURE — 80053 COMPREHEN METABOLIC PANEL: CPT

## 2023-06-22 PROCEDURE — 70498 CT ANGIOGRAPHY NECK: CPT

## 2023-06-22 PROCEDURE — 2580000003 HC RX 258: Performed by: EMERGENCY MEDICINE

## 2023-06-22 PROCEDURE — 85027 COMPLETE CBC AUTOMATED: CPT

## 2023-06-22 PROCEDURE — 96361 HYDRATE IV INFUSION ADD-ON: CPT

## 2023-06-22 PROCEDURE — G0378 HOSPITAL OBSERVATION PER HR: HCPCS

## 2023-06-22 PROCEDURE — 93005 ELECTROCARDIOGRAM TRACING: CPT | Performed by: EMERGENCY MEDICINE

## 2023-06-22 PROCEDURE — 6360000002 HC RX W HCPCS: Performed by: INTERNAL MEDICINE

## 2023-06-22 PROCEDURE — 99285 EMERGENCY DEPT VISIT HI MDM: CPT

## 2023-06-22 PROCEDURE — 70450 CT HEAD/BRAIN W/O DYE: CPT

## 2023-06-22 RX ORDER — ACETAMINOPHEN 650 MG/1
650 SUPPOSITORY RECTAL EVERY 6 HOURS PRN
Status: DISCONTINUED | OUTPATIENT
Start: 2023-06-22 | End: 2023-06-23 | Stop reason: HOSPADM

## 2023-06-22 RX ORDER — POLYETHYLENE GLYCOL 3350 17 G/17G
17 POWDER, FOR SOLUTION ORAL DAILY PRN
Status: DISCONTINUED | OUTPATIENT
Start: 2023-06-22 | End: 2023-06-23 | Stop reason: HOSPADM

## 2023-06-22 RX ORDER — ATORVASTATIN CALCIUM 10 MG/1
10 TABLET, FILM COATED ORAL NIGHTLY
Status: DISCONTINUED | OUTPATIENT
Start: 2023-06-22 | End: 2023-06-23 | Stop reason: HOSPADM

## 2023-06-22 RX ORDER — ENOXAPARIN SODIUM 100 MG/ML
40 INJECTION SUBCUTANEOUS DAILY
Status: DISCONTINUED | OUTPATIENT
Start: 2023-06-22 | End: 2023-06-23 | Stop reason: HOSPADM

## 2023-06-22 RX ORDER — LEVOTHYROXINE SODIUM 0.03 MG/1
25 TABLET ORAL DAILY
Status: DISCONTINUED | OUTPATIENT
Start: 2023-06-22 | End: 2023-06-23 | Stop reason: HOSPADM

## 2023-06-22 RX ORDER — IPRATROPIUM BROMIDE AND ALBUTEROL SULFATE 2.5; .5 MG/3ML; MG/3ML
1 SOLUTION RESPIRATORY (INHALATION) EVERY 4 HOURS PRN
Status: DISCONTINUED | OUTPATIENT
Start: 2023-06-22 | End: 2023-06-23 | Stop reason: HOSPADM

## 2023-06-22 RX ORDER — SODIUM CHLORIDE 0.9 % (FLUSH) 0.9 %
5-40 SYRINGE (ML) INJECTION EVERY 12 HOURS SCHEDULED
Status: DISCONTINUED | OUTPATIENT
Start: 2023-06-22 | End: 2023-06-23 | Stop reason: HOSPADM

## 2023-06-22 RX ORDER — 0.9 % SODIUM CHLORIDE 0.9 %
500 INTRAVENOUS SOLUTION INTRAVENOUS ONCE
Status: COMPLETED | OUTPATIENT
Start: 2023-06-22 | End: 2023-06-22

## 2023-06-22 RX ORDER — MECLIZINE HCL 12.5 MG/1
50 TABLET ORAL ONCE
Status: COMPLETED | OUTPATIENT
Start: 2023-06-22 | End: 2023-06-22

## 2023-06-22 RX ORDER — MECLIZINE HCL 12.5 MG/1
25 TABLET ORAL 3 TIMES DAILY PRN
Status: DISCONTINUED | OUTPATIENT
Start: 2023-06-22 | End: 2023-06-23 | Stop reason: HOSPADM

## 2023-06-22 RX ORDER — SODIUM CHLORIDE 0.9 % (FLUSH) 0.9 %
5-40 SYRINGE (ML) INJECTION PRN
Status: DISCONTINUED | OUTPATIENT
Start: 2023-06-22 | End: 2023-06-23 | Stop reason: HOSPADM

## 2023-06-22 RX ORDER — SODIUM CHLORIDE 9 MG/ML
INJECTION, SOLUTION INTRAVENOUS PRN
Status: DISCONTINUED | OUTPATIENT
Start: 2023-06-22 | End: 2023-06-23 | Stop reason: HOSPADM

## 2023-06-22 RX ORDER — TRAZODONE HYDROCHLORIDE 50 MG/1
100 TABLET ORAL NIGHTLY
Status: DISCONTINUED | OUTPATIENT
Start: 2023-06-22 | End: 2023-06-23 | Stop reason: HOSPADM

## 2023-06-22 RX ORDER — ONDANSETRON 4 MG/1
4 TABLET, ORALLY DISINTEGRATING ORAL EVERY 8 HOURS PRN
Status: DISCONTINUED | OUTPATIENT
Start: 2023-06-22 | End: 2023-06-23 | Stop reason: HOSPADM

## 2023-06-22 RX ORDER — POTASSIUM CHLORIDE 750 MG/1
40 TABLET, FILM COATED, EXTENDED RELEASE ORAL ONCE
Status: COMPLETED | OUTPATIENT
Start: 2023-06-22 | End: 2023-06-22

## 2023-06-22 RX ORDER — ENOXAPARIN SODIUM 100 MG/ML
30 INJECTION SUBCUTANEOUS DAILY
Status: DISCONTINUED | OUTPATIENT
Start: 2023-06-22 | End: 2023-06-22

## 2023-06-22 RX ORDER — ONDANSETRON 2 MG/ML
4 INJECTION INTRAMUSCULAR; INTRAVENOUS EVERY 6 HOURS PRN
Status: DISCONTINUED | OUTPATIENT
Start: 2023-06-22 | End: 2023-06-23 | Stop reason: HOSPADM

## 2023-06-22 RX ORDER — SODIUM CHLORIDE 9 MG/ML
INJECTION, SOLUTION INTRAVENOUS CONTINUOUS
Status: DISCONTINUED | OUTPATIENT
Start: 2023-06-22 | End: 2023-06-23 | Stop reason: HOSPADM

## 2023-06-22 RX ORDER — DULOXETIN HYDROCHLORIDE 30 MG/1
120 CAPSULE, DELAYED RELEASE ORAL DAILY
Status: DISCONTINUED | OUTPATIENT
Start: 2023-06-22 | End: 2023-06-23 | Stop reason: HOSPADM

## 2023-06-22 RX ORDER — ACETAMINOPHEN 325 MG/1
650 TABLET ORAL EVERY 6 HOURS PRN
Status: DISCONTINUED | OUTPATIENT
Start: 2023-06-22 | End: 2023-06-23 | Stop reason: HOSPADM

## 2023-06-22 RX ADMIN — ENOXAPARIN SODIUM 40 MG: 40 INJECTION SUBCUTANEOUS at 18:47

## 2023-06-22 RX ADMIN — MECLIZINE 50 MG: 12.5 TABLET ORAL at 16:54

## 2023-06-22 RX ADMIN — SODIUM CHLORIDE: 9 INJECTION, SOLUTION INTRAVENOUS at 18:53

## 2023-06-22 RX ADMIN — TRAZODONE HYDROCHLORIDE 100 MG: 50 TABLET ORAL at 20:04

## 2023-06-22 RX ADMIN — SODIUM CHLORIDE 500 ML: 9 INJECTION, SOLUTION INTRAVENOUS at 13:23

## 2023-06-22 RX ADMIN — ATORVASTATIN CALCIUM 10 MG: 10 TABLET, FILM COATED ORAL at 20:04

## 2023-06-22 RX ADMIN — IOPAMIDOL 100 ML: 755 INJECTION, SOLUTION INTRAVENOUS at 14:44

## 2023-06-22 RX ADMIN — LEVOTHYROXINE SODIUM 25 MCG: 25 TABLET ORAL at 18:48

## 2023-06-22 RX ADMIN — POTASSIUM CHLORIDE 40 MEQ: 750 TABLET, FILM COATED, EXTENDED RELEASE ORAL at 18:47

## 2023-06-22 ASSESSMENT — ENCOUNTER SYMPTOMS
EYE PAIN: 0
DIARRHEA: 0
COUGH: 0
SHORTNESS OF BREATH: 0
TROUBLE SWALLOWING: 0
ABDOMINAL PAIN: 0
NAUSEA: 0
SORE THROAT: 0
COLOR CHANGE: 0
VOMITING: 0
EYE REDNESS: 0
BACK PAIN: 0

## 2023-06-22 ASSESSMENT — LIFESTYLE VARIABLES
HOW MANY STANDARD DRINKS CONTAINING ALCOHOL DO YOU HAVE ON A TYPICAL DAY: PATIENT DOES NOT DRINK
HOW OFTEN DO YOU HAVE A DRINK CONTAINING ALCOHOL: MONTHLY OR LESS
HOW OFTEN DO YOU HAVE A DRINK CONTAINING ALCOHOL: NEVER

## 2023-06-22 ASSESSMENT — PAIN - FUNCTIONAL ASSESSMENT: PAIN_FUNCTIONAL_ASSESSMENT: NONE - DENIES PAIN

## 2023-06-22 NOTE — ED PROVIDER NOTES
[facility] ED  eMERGENCY dEPARTMENT eNCOUnter      Pt Name: Davin Hartman  MRN: [id number]  [place] 1955  Date of evaluation: 6/22/2023  Provider: Duong Wu MD    CHIEF COMPLAINT       Chief Complaint   Patient presents with    Loss of Consciousness     Syncopal episode while showing a dog out in the heat     Patient is a 69-year-old female who presents to the emergency department complaining of passing out. Patient states that she was showing a dog at a competition and did not realize her turn was up and jumped up quickly to go into the ring and started feeling dizzy. She states it felt like spinning sensation and then she passed out. She states she was dizzy for about 10 or 15 minutes afterwards but now is improving but feels wiped out. She denies any headache. She denies any chest pain or shortness of breath. Nursing Notes were reviewed. REVIEW OF SYSTEMS    (2-9 systems for level 4, 10 or more for level 5)     Review of Systems   Constitutional:  Negative for chills and fever. HENT:  Negative for ear pain, sore throat and trouble swallowing. Eyes:  Negative for pain and redness. Respiratory:  Negative for cough and shortness of breath. Cardiovascular:  Negative for chest pain and palpitations. Gastrointestinal:  Negative for abdominal pain, diarrhea, nausea and vomiting. Genitourinary:  Negative for dysuria and frequency. Musculoskeletal:  Negative for back pain and neck pain. Skin:  Negative for color change and rash. Neurological:  Positive for light-headedness and headaches. Negative for dizziness and syncope. Psychiatric/Behavioral:  Negative for hallucinations and suicidal ideas. Except as noted above the remainder of the review of systems was reviewed and negative.        PAST MEDICAL HISTORY     Past Medical History:   Diagnosis Date    COPD (chronic obstructive pulmonary disease) (HCC)     Fibromyalgia          SURGICAL HISTORY       Past

## 2023-06-22 NOTE — ED NOTES
To restroom via W/C. Complains of dizziness and she was unsteady on feet when she stood up from commode. Brought back to room in W/C. Orthos completed and she got dizzy and unbalanced when she stood up.      Jackeline Marie RN  06/22/23 [id number]

## 2023-06-22 NOTE — ED NOTES
Ambulated to restroom. Slow gait with a little bit of a unbalanced feeling. Spoke with daughter on the phone and she stated pt looked like she \"had a vagal episode\".       Sabrina Shannon RN  06/22/23 [id number]

## 2023-06-22 NOTE — PROGRESS NOTES
Pharmacist Review and Automatic Dose Adjustment of Prophylactic Enoxaparin    The reviewing pharmacist has made an adjustment to the ordered enoxaparin dose or converted to UFH per the approved [facility] protocol and table as identified below. Leila Méndez is a 75 y.o. female. Recent Labs     06/22/23  1320   CREATININE 0.59     Estimated Creatinine Clearance: 78 mL/min (based on SCr of 0.59 mg/dL). Recent Labs     06/22/23  1320   HGB 12.8   HCT 38.0        No results for input(s): INR in the last 72 hours.     Height:   Ht Readings from Last 1 Encounters:   06/22/23 5' 7\" (1.702 m)     Weight:  Wt Readings from Last 1 Encounters:   06/22/23 119 lb 11.2 oz (54.3 kg)         Plan: Based upon the patient's weight and renal function    Ordered: Enoxaparin 30mg SUBQ Daily    Changed/converted to    New Order: Enoxaparin 40mg SUBQ Daily    Thank you,  Cara Emerson, [facility]  6/22/2023, 5:49 PM

## 2023-06-23 ENCOUNTER — APPOINTMENT (OUTPATIENT)
Dept: MRI IMAGING | Age: 68
End: 2023-06-23
Payer: MEDICARE

## 2023-06-23 VITALS
BODY MASS INDEX: 18.79 KG/M2 | DIASTOLIC BLOOD PRESSURE: 96 MMHG | SYSTOLIC BLOOD PRESSURE: 129 MMHG | TEMPERATURE: 98.2 F | WEIGHT: 119.7 LBS | RESPIRATION RATE: 20 BRPM | OXYGEN SATURATION: 95 % | HEART RATE: 74 BPM | HEIGHT: 67 IN

## 2023-06-23 LAB
ANION GAP SERPL CALCULATED.3IONS-SCNC: 7 MMOL/L (ref 9–17)
BASOPHILS # BLD: 0 K/UL (ref 0–0.2)
BASOPHILS NFR BLD: 1 % (ref 0–2)
BUN SERPL-MCNC: 9 MG/DL (ref 8–23)
BUN/CREAT SERPL: 15 (ref 9–20)
CALCIUM SERPL-MCNC: 8.6 MG/DL (ref 8.6–10.4)
CHLORIDE SERPL-SCNC: 108 MMOL/L (ref 98–107)
CO2 SERPL-SCNC: 29 MMOL/L (ref 20–31)
CREAT SERPL-MCNC: 0.6 MG/DL (ref 0.5–0.9)
DIFFERENTIAL TYPE: YES
EKG ATRIAL RATE: 54 BPM
EKG P AXIS: 82 DEGREES
EKG P-R INTERVAL: 176 MS
EKG Q-T INTERVAL: 462 MS
EKG QRS DURATION: 82 MS
EKG QTC CALCULATION (BAZETT): 438 MS
EKG R AXIS: 52 DEGREES
EKG T AXIS: 76 DEGREES
EKG VENTRICULAR RATE: 54 BPM
EOSINOPHIL # BLD: 0.3 K/UL (ref 0–0.4)
EOSINOPHILS RELATIVE PERCENT: 7 % (ref 0–5)
ERYTHROCYTE [DISTWIDTH] IN BLOOD BY AUTOMATED COUNT: 13.6 % (ref 12.1–15.2)
GFR SERPL CREATININE-BSD FRML MDRD: >60 ML/MIN/1.73M2
GLUCOSE SERPL-MCNC: 91 MG/DL (ref 70–99)
HCT VFR BLD AUTO: 35.4 % (ref 36–46)
HGB BLD-MCNC: 11.8 G/DL (ref 12–16)
LYMPHOCYTES # BLD: 29 % (ref 15–40)
LYMPHOCYTES NFR BLD: 1.2 K/UL (ref 1–4.8)
MCH RBC QN AUTO: 30.7 PG (ref 26–34)
MCHC RBC AUTO-ENTMCNC: 33.4 G/DL (ref 31–37)
MCV RBC AUTO: 91.8 FL (ref 80–100)
MONOCYTES NFR BLD: 0.4 K/UL (ref 0–1)
MONOCYTES NFR BLD: 10 % (ref 4–8)
NEUTROPHILS NFR BLD: 53 % (ref 47–75)
NEUTS SEG NFR BLD: 2.1 K/UL (ref 2.5–7)
PLATELET # BLD AUTO: 182 K/UL (ref 140–450)
POTASSIUM SERPL-SCNC: 3.9 MMOL/L (ref 3.7–5.3)
RBC # BLD AUTO: 3.86 M/UL (ref 4–5.2)
SODIUM SERPL-SCNC: 144 MMOL/L (ref 135–144)
WBC OTHER # BLD: 4 K/UL (ref 3.5–11)

## 2023-06-23 PROCEDURE — 85027 COMPLETE CBC AUTOMATED: CPT

## 2023-06-23 PROCEDURE — 93010 ELECTROCARDIOGRAM REPORT: CPT | Performed by: INTERNAL MEDICINE

## 2023-06-23 PROCEDURE — 80048 BASIC METABOLIC PNL TOTAL CA: CPT

## 2023-06-23 PROCEDURE — 36415 COLL VENOUS BLD VENIPUNCTURE: CPT

## 2023-06-23 PROCEDURE — 6370000000 HC RX 637 (ALT 250 FOR IP): Performed by: INTERNAL MEDICINE

## 2023-06-23 PROCEDURE — G0378 HOSPITAL OBSERVATION PER HR: HCPCS

## 2023-06-23 PROCEDURE — 94761 N-INVAS EAR/PLS OXIMETRY MLT: CPT

## 2023-06-23 PROCEDURE — 96361 HYDRATE IV INFUSION ADD-ON: CPT

## 2023-06-23 RX ORDER — MECLIZINE HYDROCHLORIDE 25 MG/1
25 TABLET ORAL 3 TIMES DAILY PRN
Qty: 10 TABLET | Refills: 0 | Status: SHIPPED | OUTPATIENT
Start: 2023-06-23 | End: 2023-07-03

## 2023-06-23 RX ADMIN — LEVOTHYROXINE SODIUM 25 MCG: 25 TABLET ORAL at 06:46

## 2023-06-23 RX ADMIN — DULOXETINE HYDROCHLORIDE 120 MG: 30 CAPSULE, DELAYED RELEASE ORAL at 08:38

## 2023-06-23 NOTE — H&P
(x)  I confirmed that the patient's Advanced Care Plan is present, code status documented, or surrogate decision maker is listed in the patient's medical record. ( )  The patient's advanced care plan is not present because:  (select)   ( ) I confirmed today that the patient does not wish or was not able to name a surrogate decision maker or provide an [address] ( ) Hospice care is currently being provided or has been provided this calender year. ( )  I did not confirm today the presence of an [address] or surrogate decision maker documented within the patient's medical record. (Does not satisfy MIPS performance).             Jorden Dunbar MD, MD  Admitting Hospitalist

## 2023-06-23 NOTE — DISCHARGE INSTRUCTIONS
Discharge Instructions    Admission Date:  6/22/2023  Discharge Date:  6/23/23    Disposition:  Home    Discharge Instructions:  Continue previous home medication (should only be using Advair or Trelegy, not both). Use Meclizine 25 mg every 8 hours as needed for dizziness. Keep well hydrated. Activity:  As tolerated. Diet:  General.     Follow up with Christina Ng, DO in 1 week.

## 2023-06-23 NOTE — CARE COORDINATION
Case Management Assessment  Initial Evaluation    Date/Time of Evaluation: 6/23/2023 9:15 AM  Assessment Completed by: Ramos Diggs RN    If patient is discharged prior to next notation, then this note serves as note for discharge by case management. Patient Name: Irvin Nye                   YOB: 1955  Diagnosis: Syncope and collapse [R55]  Ataxia [R27.0]  Syncope, unspecified syncope type [R55]                   Date / Time: 6/22/2023 12:54 PM    Patient Admission Status: Observation   Readmission Risk (Low < 19, Mod (19-27), High > 27): No data recorded  Current PCP: Felicitas Elias, DO  PCP verified by CM? (P) Yes (Dr. Birgit Ontiveros)    Chart Reviewed: Yes      History Provided by: (P) Patient  Patient Orientation: (P) Alert and Oriented, Person, Place, Situation, Self    Patient Cognition: (P) Alert    Hospitalization in the last 30 days (Readmission):  No    If yes, Readmission Assessment in CM Navigator will be completed.     Advance Directives:      Code Status: Full Code   Patient's Primary Decision Maker is: (P) Legal Next of Kin      Discharge Planning:    Patient lives with: (P) Alone Type of Home: (P) House  Primary Care Giver: (P) Self  Patient Support Systems include: (P) Children, Family Members   Current Financial resources: (P) Medicare  Current community resources: (P) None  Current services prior to admission: (P) None            Current DME:              Type of Home Care services:  (P) None    ADLS  Prior functional level: (P) Independent in ADLs/IADLs  Current functional level: (P) Independent in ADLs/IADLs    PT AM-PAC:   /24  OT AM-PAC:   /24    Family can provide assistance at DC: (P) Yes  Would you like Case Management to discuss the discharge plan with any other family members/significant others, and if so, who? (P) No  Plans to Return to Present Housing: (P) Yes  Other Identified Issues/Barriers to RETURNING to current housing: none  Potential Assistance

## 2023-06-23 NOTE — PROGRESS NOTES
Discharge instructions reviewed with patient with special attention to new medication and follow up appointment.

## 2023-06-23 NOTE — DISCHARGE SUMMARY
Hospitalist Discharge Summary    Adán White  :  1955  MRN:  [id number]    Admit date:  2023  Discharge date:  23    Admitting Physician:  Fidencio Mcclendon MD    Discharge Diagnoses:   Near syncope. Vertigo. Hyperlipidemia. Fibromyalgia. Hypothyroidism. Hypokalemia - replaced. Admission Condition:  fair      Discharged Condition:  good    Hospital Course: The patient is a 75 y.o. female who presents to the ER with near syncope with severe light headedness and dizziness. According to Ryan Bear, who is from [facility], she was showing a great damion at a dog show. She states she was announced  to show her dog and had to get up and run across he ring. When getting up she noticed she was feeling light headed. When getting to the area that she started from she bent over to fix the dog's front legs and got very light headed with the room spinning. She states she knew she was going down but feels someone helped her to the ground. She isn't sure if she went completely out. During that time she felt nauseated but was aware there was a lot of people around her. She states prior to this she had been feeling well other than having a headache daily for the previous few days that were frontal and pressure like. She had bronchitis like symptoms for 3 weeks but symptoms had resolved. Ryan Bear states her diet is terrible, mostly sweats. She only drinks 1 gator aid or sports drink a day. When getting up her body felt like it was weighted down and she continued to be light headed. With her symptoms she was brought to the ER. In the ER her CMP was normal other than a Potassium at 3.2. CBC was normal.  Troponin was 9. CT of the head was negative. CTA of the head and neck showed:     1. No extracranial carotid artery stenosis. 2. Small vertebrobasilar system as described. This is most likely developmental   but could produce symptoms of vertebrobasilar insufficiency.    3.

## 2023-06-23 NOTE — PLAN OF CARE
Problem: Discharge Planning  Goal: Discharge to home or other facility with appropriate resources  Outcome: Progressing  Flowsheets  Taken 6/22/2023 2000 by Bryant De La Rosa RN  Discharge to home or other facility with appropriate resources:   Identify barriers to discharge with patient and caregiver   Arrange for needed discharge resources and transportation as appropriate   Identify discharge learning needs (meds, wound care, etc)   Refer to discharge planning if patient needs post-hospital services based on physician order or complex needs related to functional status, cognitive ability or social support system    Problem: Safety - Adult  Goal: Free from fall injury  Outcome: Progressing

## 2023-06-23 NOTE — PROGRESS NOTES
Patient refused all inhalers and states she does not take them during summer months at home because she only has trouble with COPD during winter months. Patient informed that she has PRN treatments and she may call if she decides she would like to take them or the inhalers.

## 2023-11-03 PROBLEM — D12.8 BENIGN NEOPLASM OF RECTUM: Status: ACTIVE | Noted: 2023-11-03

## 2023-11-03 PROBLEM — K29.30 CHRONIC SUPERFICIAL GASTRITIS: Status: ACTIVE | Noted: 2023-11-03

## 2023-11-03 PROBLEM — M79.89 SWELLING OF LEFT LOWER EXTREMITY: Status: ACTIVE | Noted: 2023-11-03

## 2024-07-05 ENCOUNTER — HOSPITAL ENCOUNTER (EMERGENCY)
Age: 69
Discharge: HOME | End: 2024-07-05
Payer: MEDICARE

## 2024-07-05 ENCOUNTER — APPOINTMENT (OUTPATIENT)
Dept: GENERAL RADIOLOGY | Age: 69
End: 2024-07-05
Payer: MEDICARE

## 2024-07-05 VITALS
SYSTOLIC BLOOD PRESSURE: 156 MMHG | RESPIRATION RATE: 14 BRPM | HEIGHT: 67 IN | OXYGEN SATURATION: 92 % | DIASTOLIC BLOOD PRESSURE: 74 MMHG | HEART RATE: 72 BPM | BODY MASS INDEX: 20.72 KG/M2 | WEIGHT: 132 LBS | TEMPERATURE: 97.9 F

## 2024-07-05 DIAGNOSIS — L03.116 CELLULITIS OF LEFT LOWER EXTREMITY: Primary | ICD-10-CM

## 2024-07-05 PROCEDURE — 99283 EMERGENCY DEPT VISIT LOW MDM: CPT

## 2024-07-05 PROCEDURE — 73620 X-RAY EXAM OF FOOT: CPT

## 2024-07-05 PROCEDURE — 6370000000 HC RX 637 (ALT 250 FOR IP): Performed by: NURSE PRACTITIONER

## 2024-07-05 PROCEDURE — 73610 X-RAY EXAM OF ANKLE: CPT

## 2024-07-05 RX ORDER — CEPHALEXIN 500 MG/1
500 CAPSULE ORAL ONCE
Status: COMPLETED | OUTPATIENT
Start: 2024-07-05 | End: 2024-07-05

## 2024-07-05 RX ORDER — CEPHALEXIN 500 MG/1
500 CAPSULE ORAL EVERY 6 HOURS SCHEDULED
Status: DISCONTINUED | OUTPATIENT
Start: 2024-07-06 | End: 2024-07-05

## 2024-07-05 RX ORDER — CEPHALEXIN 500 MG/1
500 CAPSULE ORAL 4 TIMES DAILY
Qty: 28 CAPSULE | Refills: 0 | Status: SHIPPED | OUTPATIENT
Start: 2024-07-05 | End: 2024-07-12

## 2024-07-05 RX ORDER — HYDROCODONE BITARTRATE AND ACETAMINOPHEN 5; 325 MG/1; MG/1
1 TABLET ORAL ONCE
Status: COMPLETED | OUTPATIENT
Start: 2024-07-05 | End: 2024-07-05

## 2024-07-05 RX ADMIN — CEPHALEXIN 500 MG: 500 CAPSULE ORAL at 20:59

## 2024-07-05 RX ADMIN — HYDROCODONE BITARTRATE AND ACETAMINOPHEN 1 TABLET: 5; 325 TABLET ORAL at 19:21

## 2024-07-05 ASSESSMENT — PAIN DESCRIPTION - ORIENTATION
ORIENTATION: RIGHT
ORIENTATION: LEFT

## 2024-07-05 ASSESSMENT — PAIN SCALES - GENERAL
PAINLEVEL_OUTOF10: 10
PAINLEVEL_OUTOF10: 10

## 2024-07-05 ASSESSMENT — PAIN - FUNCTIONAL ASSESSMENT
PAIN_FUNCTIONAL_ASSESSMENT: 0-10
PAIN_FUNCTIONAL_ASSESSMENT: PREVENTS OR INTERFERES WITH ALL ACTIVE AND SOME PASSIVE ACTIVITIES

## 2024-07-05 ASSESSMENT — PAIN DESCRIPTION - DESCRIPTORS: DESCRIPTORS: SHARP;BURNING

## 2024-07-05 ASSESSMENT — PAIN DESCRIPTION - LOCATION
LOCATION: ANKLE;FOOT
LOCATION: ANKLE;FOOT

## 2024-07-05 ASSESSMENT — PAIN DESCRIPTION - PAIN TYPE: TYPE: ACUTE PAIN

## 2024-07-06 NOTE — ED PROVIDER NOTES
Independent JP Visit.      [facility]  Department of Emergency Medicine   ED  Encounter Note  Admit Date/RoomTime: 2024  6:36 PM  ED Room:     NAME: Marianna Whipple  : 1955  MRN: [id number]     Chief Complaint:  Foot Pain (Pt with sudden onset of left foot and ankle pain today about 2:30pm with swelling and denies injury)    History of Present Illness       Marianna Whipple is a 68 y.o. old female who presents to the emergency department with complaints of left lateral ankle pain and swelling that developed this afternoon just prior to arrival.  Patient denies injury.  Patient complains of redness and swelling to the lateral aspect.  Patient states a small abrasion is appreciated over the center of the swelling.  Patient denies striking her ankle or twisting her ankle.  Patient states she is unable to walk on the left foot and ankle due to pain.  Patient states she does have crutches she can use to help with ambulation.  Patient denies fever or chills.  Patient denies history of blood clots.  Patient states she was sitting out at the carport earlier today and may have been bitten by an insect.  Patient does not specifically recall being bitten by any insect.  ROS   Pertinent positives and negatives are stated within HPI, all other systems reviewed and are negative.    Past Medical History:  has a past medical history of COPD (chronic obstructive pulmonary disease) (HCC), Fibromyalgia, Hyperlipidemia, and Thyroid disease.    Surgical History:  has a past surgical history that includes Lumbar spine surgery.    Social History:  reports that she quit smoking about 16 years ago. Her smoking use included cigarettes. She started smoking about 52 years ago. She has a 54.0 pack-year smoking history. She has never used smokeless tobacco. She reports that she does not currently use alcohol. She reports that she does not use drugs.    Family History: family history includes Cancer in her  death before the patient can return for re-evaluation is most likely lower than the risk of death attributable to being in the hospital.   Plan of Care/Counseling:  CHINA Padilla NP reviewed today's visit with the patient in addition to providing specific details for the plan of care and counseling regarding the diagnosis and prognosis.  Questions are answered at this time and are agreeable with the plan.    Assessment      1. Cellulitis of left lower extremity      Plan   Discharged home.  Patient condition is stable    New Medications     Discharge Medication List as of 7/5/2024  9:03 PM        START taking these medications    Details   cephALEXin (KEFLEX) 500 MG capsule Take 1 capsule by mouth 4 times daily for 7 days, Disp-28 capsule, R-0Normal           Electronically signed by CHINA Padilla NP   DD: 7/5/24  **This report was transcribed using voice recognition software. Every effort was made to ensure accuracy; however, inadvertent computerized transcription errors may be present.  END OF ED PROVIDER NOTE

## 2024-10-07 ENCOUNTER — HOSPITAL ENCOUNTER (EMERGENCY)
Age: 69
Discharge: HOME | End: 2024-10-07
Payer: COMMERCIAL

## 2024-10-07 ENCOUNTER — APPOINTMENT (OUTPATIENT)
Dept: GENERAL RADIOLOGY | Age: 69
End: 2024-10-07
Payer: COMMERCIAL

## 2024-10-07 VITALS
WEIGHT: 132 LBS | SYSTOLIC BLOOD PRESSURE: 120 MMHG | RESPIRATION RATE: 20 BRPM | DIASTOLIC BLOOD PRESSURE: 53 MMHG | TEMPERATURE: 97.9 F | OXYGEN SATURATION: 95 % | BODY MASS INDEX: 20.67 KG/M2 | HEART RATE: 69 BPM
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
## 2024-10-07 DIAGNOSIS — A35 TETANUS: ICD-10-CM

## 2024-10-07 DIAGNOSIS — S65.517A LACERATION OF BLOOD VESSEL OF LEFT LITTLE FINGER, INITIAL ENCOUNTER: Primary | ICD-10-CM

## 2024-10-07 PROCEDURE — 6370000000 HC RX 637 (ALT 250 FOR IP): Performed by: NURSE PRACTITIONER

## 2024-10-07 PROCEDURE — 73140 X-RAY EXAM OF FINGER(S): CPT

## 2024-10-07 PROCEDURE — 99284 EMERGENCY DEPT VISIT MOD MDM: CPT

## 2024-10-07 PROCEDURE — 90471 IMMUNIZATION ADMIN: CPT | Performed by: NURSE PRACTITIONER

## 2024-10-07 PROCEDURE — 90715 TDAP VACCINE 7 YRS/> IM: CPT | Performed by: NURSE PRACTITIONER

## 2024-10-07 PROCEDURE — 12002 RPR S/N/AX/GEN/TRNK2.6-7.5CM: CPT

## 2024-10-07 PROCEDURE — 6360000002 HC RX W HCPCS: Performed by: NURSE PRACTITIONER

## 2024-10-07 RX ORDER — ACETAMINOPHEN 500 MG
1000 TABLET ORAL ONCE
Status: COMPLETED | OUTPATIENT
Start: 2024-10-07 | End: 2024-10-07

## 2024-10-07 RX ORDER — LIDOCAINE HYDROCHLORIDE 10 MG/ML
5 INJECTION, SOLUTION INFILTRATION; PERINEURAL ONCE
Status: DISCONTINUED | OUTPATIENT
Start: 2024-10-07 | End: 2024-10-07 | Stop reason: HOSPADM

## 2024-10-07 RX ORDER — IBUPROFEN 400 MG/1
400 TABLET, FILM COATED ORAL ONCE
Status: COMPLETED | OUTPATIENT
Start: 2024-10-07 | End: 2024-10-07

## 2024-10-07 RX ORDER — CEPHALEXIN 500 MG/1
500 CAPSULE ORAL ONCE
Status: COMPLETED | OUTPATIENT
Start: 2024-10-07 | End: 2024-10-07

## 2024-10-07 RX ADMIN — ACETAMINOPHEN 1000 MG: 500 TABLET ORAL at 15:15

## 2024-10-07 RX ADMIN — TETANUS TOXOID, REDUCED DIPHTHERIA TOXOID AND ACELLULAR PERTUSSIS VACCINE, ADSORBED 0.5 ML: 5; 2.5; 8; 8; 2.5 SUSPENSION INTRAMUSCULAR at 15:13

## 2024-10-07 RX ADMIN — CEPHALEXIN 500 MG: 500 CAPSULE ORAL at 15:15

## 2024-10-07 RX ADMIN — IBUPROFEN 400 MG: 400 TABLET, FILM COATED ORAL at 15:15

## 2024-10-07 ASSESSMENT — PAIN DESCRIPTION - ORIENTATION: ORIENTATION: LEFT

## 2024-10-07 ASSESSMENT — PAIN SCALES - GENERAL
PAINLEVEL_OUTOF10: 10
PAINLEVEL_OUTOF10: 8

## 2024-10-07 ASSESSMENT — PAIN - FUNCTIONAL ASSESSMENT: PAIN_FUNCTIONAL_ASSESSMENT: 0-10

## 2024-10-07 ASSESSMENT — PAIN DESCRIPTION - DESCRIPTORS: DESCRIPTORS: ACHING;BURNING;DULL

## 2024-10-07 ASSESSMENT — PAIN DESCRIPTION - LOCATION: LOCATION: FINGER (COMMENT WHICH ONE)

## 2024-10-07 NOTE — DISCHARGE INSTRUCTIONS
Finish antibiotics    Tylenol for pain    Sutures out in one week    Keep wound covered and clean  + off work unless can be in a job that does not require hands to get wet or use left 5th finger without bumping it

## 2024-10-07 NOTE — ED PROVIDER NOTES
Independent JP Visit.     HPI:  10/7/24, Time: 3:57 PM EDT         Marianna Whipple is a 68 y.o. female presenting to the ED for left 5th finger laceration tip, beginning 1 hour ago.  The complaint has been persistent, moderate in severity, and worsened by nothing.  Patient is not sure of her tetanus status. She was wearing gloves when they sort of slipped off the  cutting the side of her left top part of her left the finger. There is nail involvement. Bleeding is controlled. No aspirin or blood thinners. Denied any issues with regard to movement of her finger. No numbness weakness. Bleeding is controlled. There is a u shaped laceration 1.0 cm x 1.0 cm x 1.0 cm.      ROS:   Pertinent positives and negatives are stated within HPI, all other systems reviewed and are negative.  --------------------------------------------- PAST HISTORY ---------------------------------------------  Past Medical History:  has a past medical history of COPD (chronic obstructive pulmonary disease) (HCC), Fibromyalgia, Hyperlipidemia, and Thyroid disease.    Past Surgical History:  has a past surgical history that includes Lumbar spine surgery.    Social History:  reports that she quit smoking about 16 years ago. Her smoking use included cigarettes. She started smoking about 52 years ago. She has a 54 pack-year smoking history. She has never used smokeless tobacco. She reports that she does not currently use alcohol. She reports that she does not use drugs.    Family History: family history includes Cancer in her father.     The patient’s home medications have been reviewed.    Allergies: Patient has no known allergies.    ---------------------------------------------------PHYSICAL EXAM--------------------------------------    Constitutional/General: Alert and oriented x3, well appearing, non toxic in NAD  Head: Normocephalic and atraumatic  Eyes: PERRL, EOMI  Mouth: Oropharynx clear, handling secretions, no trismus  Neck:

## 2024-10-29 ENCOUNTER — EVALUATION (OUTPATIENT)
Dept: OCCUPATIONAL THERAPY | Age: 69
End: 2024-10-29
Payer: MEDICARE

## 2024-10-29 DIAGNOSIS — S65.517A LACERATION OF BLOOD VESSEL OF LEFT LITTLE FINGER, INITIAL ENCOUNTER: Primary | ICD-10-CM

## 2024-10-29 PROCEDURE — 97165 OT EVAL LOW COMPLEX 30 MIN: CPT | Performed by: OCCUPATIONAL THERAPIST

## 2024-10-29 PROCEDURE — 97140 MANUAL THERAPY 1/> REGIONS: CPT | Performed by: OCCUPATIONAL THERAPIST

## 2024-10-29 PROCEDURE — 97110 THERAPEUTIC EXERCISES: CPT | Performed by: OCCUPATIONAL THERAPIST

## 2024-10-29 NOTE — PROGRESS NOTES
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 Pt. Reports she sliced her tip of small finger of the left hand. Pt. reports shooting pain, numbness, tingling of the finger tip.     Home Living: lives alone  Prior Level of Function: independent    Cognition:   Alert/Oriented x3     IADL STATUS:   Ind Mod I Min A Mod A Max A Dep Other   Homemaking Responsibility  x        Shopping Responsibility:  x        Mode of Transportation:  x        Leisure & Hobbies:  x        Work:      x      Comments:not working at this time    ADL STATUS:   Ind Mod I Min A Mod A Max A Dep Other   Feeding:  x        Grooming:  x        Bathing:  x        UE Dressing:  x        LE Dressing:  x        Toileting:  x        Transfers: x           Comments:    Pain Level: 0-7, aching, needle-like, and shooting    UE Assessment:  L Hand ROM    LS Finger MCP Flexion/Extension */ 0-45 H 100/0     PIP Flexion/Extension 100*/ 0 85/0     DIP Flexion/Extension 70-90*/0 45/0       Comment: Hand Dominance is right    Sensation: LSF  Able To Sense (Y) / Unable to Sense (N)  SEMMES-REJI Thumb 2nd Digit 3rd Digit  4th Digit  5th Digit    Normal Touch  Size: 2.83     N   Diminished Light Touch   Size: 3.61 Y Y Y Y N   Diminished Protective Sense  Size: 4.31     Y   Loss of Protective Sense   Size: 4.56        Loss of Sensation  Size: 6.65            Edema Description/Circumferential Measurements:   Mild tip swelling    Dynamometer (setting 2) 10/29/24     Left 35#     Right 42#     Pinch (lateral)      Left 9#     Right 10#     Pinch (tripod)      Left 8#     Right 8#        9 Hole Peg test      Left wnl     Right wnl        QuickDash       50%       Intervention: fluido, arom, prom, dip blocking , soft tissue mobilization, Beginning hep active rom exercises provided with good understanding.       Eval Complexity: low  Profile and History-

## 2024-10-31 ENCOUNTER — TREATMENT (OUTPATIENT)
Dept: OCCUPATIONAL THERAPY | Age: 69
End: 2024-10-31

## 2024-10-31 DIAGNOSIS — S65.517A LACERATION OF BLOOD VESSEL OF LEFT LITTLE FINGER, INITIAL ENCOUNTER: Primary | ICD-10-CM

## 2024-10-31 NOTE — PROGRESS NOTES
OCCUPATIONAL THERAPY DAILY NOTE  [facility] PHYSICIANS [place] [facility]ARE [facility] OCCUPATIONAL THERAPY  [address]  [address] FLO[address]  [facility] [address]  Dept: [phone]  Loc: [phone]   [facility] OT Fax: [phone]      Date:  10/31/2024    Initial Evaluation Date: 10/29/24                              Evaluating Therapist: Amari Suarez OT     Patient Name:  Marianna Whipple                  :  1955     Restrictions/Precautions:  per protocol, low fall risk  Diagnosis:    Diagnosis   S65.517A (ICD-10-CM) - Laceration of blood vessel of left little finger, initial encounter                                                                 Date of Surgery/Injury: Oct 7th lacerated L small finger tip     Insurance/Certification information: [facility]  12 visits until 24  Plan of care signed (Y/N): electronic signed     Visit# / total visits: 2 /  approved [facility]  Referring Practitioner:      James Cook DO      Specific Practitioner Orders: eval and treat.      Assessment of current deficits   [x] Functional mobility             [x] ADLs           [x] Strength                  [] Cognition   [] Functional transfers           [x] IADLs          [x] Safety Awareness  [x] Endurance   [] Fine Motor Coordination    [] Balance      [] Vision/perception    [x] Sensation     [] Gross Motor Coordination [x] ROM           [x] Pain                        [] Edema          [x] Scar Adhesion/Skin Integrity      OT PLAN OF CARE   OT POC based on physician orders, patient diagnosis and results of clinical assessment  Frequency/Duration  2x week for 12 visits. Certification period: from 10/29/24 to 24  Reassessment date:                                 GOALS (Long term same as Short term):  1) Patient will demonstrate good understanding of home program (exercises/activities/diagnosis/prognosis/goals) with good accuracy.   2) Patient will demonstrate increased

## 2024-11-05 ENCOUNTER — TREATMENT (OUTPATIENT)
Dept: OCCUPATIONAL THERAPY | Age: 69
End: 2024-11-05
Payer: MEDICARE

## 2024-11-05 DIAGNOSIS — S65.517A LACERATION OF BLOOD VESSEL OF LEFT LITTLE FINGER, INITIAL ENCOUNTER: Primary | ICD-10-CM

## 2024-11-05 PROCEDURE — 97022 WHIRLPOOL THERAPY: CPT | Performed by: OCCUPATIONAL THERAPIST

## 2024-11-05 PROCEDURE — 97140 MANUAL THERAPY 1/> REGIONS: CPT | Performed by: OCCUPATIONAL THERAPIST

## 2024-11-05 PROCEDURE — 97110 THERAPEUTIC EXERCISES: CPT | Performed by: OCCUPATIONAL THERAPIST

## 2024-11-05 NOTE — PROGRESS NOTES
OCCUPATIONAL THERAPY DAILY NOTE  [facility] PHYSICIANS [place] SPECIALTY CARE [facility] OCCUPATIONAL THERAPY  [address]  [address] FLO[address]  [facility] [address]  Dept: [phone]  Loc: [phone]   [facility] OT Fax: [phone]      Date:  2024    Initial Evaluation Date: 10/29/24                              Evaluating Therapist: Amari Suarez OT     Patient Name:  Marianna Whipple                  :  1955     Restrictions/Precautions:  per protocol, low fall risk  Diagnosis:    Diagnosis   S65.517A (ICD-10-CM) - Laceration of blood vessel of left little finger, initial encounter                                                                 Date of Surgery/Injury: Oct 7th lacerated L small finger tip     Insurance/Certification information: [facility]  12 visits until 24  Plan of care signed (Y/N): electronic signed     Visit# / total visits: 3  12 approved [facility]  Referring Practitioner:      James Cook DO      Specific Practitioner Orders: eval and treat.      Assessment of current deficits   [x] Functional mobility             [x] ADLs           [x] Strength                  [] Cognition   [] Functional transfers           [x] IADLs          [x] Safety Awareness  [x] Endurance   [] Fine Motor Coordination    [] Balance      [] Vision/perception    [x] Sensation     [] Gross Motor Coordination [x] ROM           [x] Pain                        [] Edema          [x] Scar Adhesion/Skin Integrity      OT PLAN OF CARE   OT POC based on physician orders, patient diagnosis and results of clinical assessment  Frequency/Duration  2x week for 12 visits. Certification period: from 10/29/24 to 24  Reassessment date:                                 GOALS (Long term same as Short term):  1) Patient will demonstrate good understanding of home program (exercises/activities/diagnosis/prognosis/goals) with good accuracy.   2) Patient will demonstrate increased active/passive

## 2024-11-05 NOTE — PROGRESS NOTES
OCCUPATIONAL THERAPY DAILY NOTE  [facility] PHYSICIANS [place] SPECIALTY CARE [facility] OCCUPATIONAL THERAPY  [address]  [address] FLO[address]  [facility] [address]  Dept: [phone]  Loc: [phone]   [facility] OT Fax: [phone]      Date:  2024    Initial Evaluation Date: 10/29/24                              Evaluating Therapist: Amari Suarez OT     Patient Name:  Marianna Whipple                  :  1955     Restrictions/Precautions:  per protocol, low fall risk  Diagnosis:    Diagnosis   S65.517A (ICD-10-CM) - Laceration of blood vessel of left little finger, initial encounter                                                                 Date of Surgery/Injury: Oct 7th lacerated L small finger tip     Insurance/Certification information: [facility]  12 visits until 24  Plan of care signed (Y/N): electronic signed     Visit# / total visits:  approved [facility]  Referring Practitioner:      James Cook DO      Specific Practitioner Orders: eval and treat.      Assessment of current deficits   [x] Functional mobility             [x] ADLs           [x] Strength                  [] Cognition   [] Functional transfers           [x] IADLs          [x] Safety Awareness  [x] Endurance   [] Fine Motor Coordination    [] Balance      [] Vision/perception    [x] Sensation     [] Gross Motor Coordination [x] ROM           [x] Pain                        [] Edema          [x] Scar Adhesion/Skin Integrity      OT PLAN OF CARE   OT POC based on physician orders, patient diagnosis and results of clinical assessment  Frequency/Duration  2x week for 12 visits. Certification period: from 10/29/24 to 24  Reassessment date:                                 GOALS (Long term same as Short term):  1) Patient will demonstrate good understanding of home program (exercises/activities/diagnosis/prognosis/goals) with good accuracy.   2) Patient will demonstrate increased active/passive

## 2024-11-07 ENCOUNTER — TREATMENT (OUTPATIENT)
Dept: OCCUPATIONAL THERAPY | Age: 69
End: 2024-11-07
Payer: MEDICARE

## 2024-11-07 DIAGNOSIS — S65.517A LACERATION OF BLOOD VESSEL OF LEFT LITTLE FINGER, INITIAL ENCOUNTER: Primary | ICD-10-CM

## 2024-11-07 PROCEDURE — 97110 THERAPEUTIC EXERCISES: CPT | Performed by: OCCUPATIONAL THERAPIST

## 2024-11-07 PROCEDURE — 97022 WHIRLPOOL THERAPY: CPT | Performed by: OCCUPATIONAL THERAPIST

## 2024-11-07 PROCEDURE — 97140 MANUAL THERAPY 1/> REGIONS: CPT | Performed by: OCCUPATIONAL THERAPIST

## 2024-11-07 NOTE — PROGRESS NOTES
OCCUPATIONAL THERAPY DAILY NOTE  [facility] PHYSICIANS [place] SPECIALTY CARE [facility] OCCUPATIONAL THERAPY  [address]  2[address]LO[address]  [facility] [address]  Dept: [phone]  Loc: [phone]   [facility] OT Fax: [phone]      Date:  2024    Initial Evaluation Date: 10/29/24                              Evaluating Therapist: Amari Suarez OT     Patient Name:  Marianna Whipple                  :  1955     Restrictions/Precautions:  per protocol, low fall risk  Diagnosis:    Diagnosis   S65.517A (ICD-10-CM) - Laceration of blood vessel of left little finger, initial encounter                                                                 Date of Surgery/Injury: Oct 7th lacerated L small finger tip     Insurance/Certification information: [facility]  12 visits until 24  Plan of care signed (Y/N): electronic signed     Visit# / total visits:  approved [facility]  Referring Practitioner:      Jmaes Cook DO      Specific Practitioner Orders: eval and treat.      Assessment of current deficits   [x] Functional mobility             [x] ADLs           [x] Strength                  [] Cognition   [] Functional transfers           [x] IADLs          [x] Safety Awareness  [x] Endurance   [] Fine Motor Coordination    [] Balance      [] Vision/perception    [x] Sensation     [] Gross Motor Coordination [x] ROM           [x] Pain                        [] Edema          [x] Scar Adhesion/Skin Integrity      OT PLAN OF CARE   OT POC based on physician orders, patient diagnosis and results of clinical assessment  Frequency/Duration  2x week for 12 visits. Certification period: from 10/29/24 to 24  Reassessment date:                                 GOALS (Long term same as Short term):  1) Patient will demonstrate good understanding of home program (exercises/activities/diagnosis/prognosis/goals) with good accuracy.   2) Patient will demonstrate increased active/passive

## 2024-11-11 ENCOUNTER — TREATMENT (OUTPATIENT)
Dept: OCCUPATIONAL THERAPY | Age: 69
End: 2024-11-11
Payer: MEDICARE

## 2024-11-11 DIAGNOSIS — S65.517A LACERATION OF BLOOD VESSEL OF LEFT LITTLE FINGER, INITIAL ENCOUNTER: Primary | ICD-10-CM

## 2024-11-11 PROCEDURE — 97022 WHIRLPOOL THERAPY: CPT | Performed by: OCCUPATIONAL THERAPIST

## 2024-11-11 PROCEDURE — 97110 THERAPEUTIC EXERCISES: CPT | Performed by: OCCUPATIONAL THERAPIST

## 2024-11-11 PROCEDURE — 97140 MANUAL THERAPY 1/> REGIONS: CPT | Performed by: OCCUPATIONAL THERAPIST

## 2024-11-19 ENCOUNTER — TREATMENT (OUTPATIENT)
Dept: OCCUPATIONAL THERAPY | Age: 69
End: 2024-11-19
Payer: MEDICARE

## 2024-11-19 DIAGNOSIS — S65.517A LACERATION OF BLOOD VESSEL OF LEFT LITTLE FINGER, INITIAL ENCOUNTER: Primary | ICD-10-CM

## 2024-11-19 PROCEDURE — 97022 WHIRLPOOL THERAPY: CPT | Performed by: OCCUPATIONAL THERAPIST

## 2024-11-19 PROCEDURE — 97110 THERAPEUTIC EXERCISES: CPT | Performed by: OCCUPATIONAL THERAPIST

## 2024-11-19 PROCEDURE — 97140 MANUAL THERAPY 1/> REGIONS: CPT | Performed by: OCCUPATIONAL THERAPIST

## 2024-11-19 NOTE — PROGRESS NOTES
active/passive range of motion of their affected hand/UE to [facility] for ADL/IADL completion.  3) Patient will demonstrate increased /pinch strength of at least 5 / 2 pinch pounds of their affected hand/UE.   4) Patient to report decreased pain in their affected hand/ upper extremity from 0-7/10 to 0-2/10 or less with functional use.   5) Patient to report a decrease in hypersensitivity from 50% to 20% or less in their affected hand/UE   6) Patient will be knowledgeable of edema control techniques as evident with decreases from mild to none.   7) Patient will demonstrate a non-tender/non-adherent scar.   8) Patient will report ADL functions as Mod I/I using affected hand/UE   8) Patient will decrease QuickDASH score to 15% or less for increased participation in daily functional activities.          TODAY'S TREATMENT     Pain Level: 0-6 on scale of 1-10, sharp and shooting    Subjective: \"My finger is some less sensitive, but frustrated because hypersensitivity cont.s. Not feeling like I'm ready to RTW \"     Objective:    Updated POC to be completed by 10th visit.     INTERVENTION: COMPLETED: SPECIFICS/COMMENTS:   Modality:     fluido x 10 min to increase tissue mobility/ desensitization        AROM:     L small finger x Active ext /flex 15x2  Tendon gliding 10x  Bean grasp for rom and desensitization  Small peg activity        AAROM:               PROM/Stretching:               Scar Mass/Edema Control:     Soft tissue mobilization x Left small finger tip/desensitization rubbing, massaging.         Strengthening:     Yellow digiflex x 3 min   gripper x Gripper with pom poms,, 3 bands.    putty x Green roll and peg removal   Other:     hep x Tendon glides, desensitization rubbing different textures on finger tip small finger Left          Assessment/Comments: Good hiren with exercises. Strength and endurance cont to progress. Some hypersensitivity cont's tip.       -Rehab Potential: Good   -Patient Response to

## 2024-11-21 ENCOUNTER — TREATMENT (OUTPATIENT)
Dept: OCCUPATIONAL THERAPY | Age: 69
End: 2024-11-21

## 2024-11-21 DIAGNOSIS — S65.517A LACERATION OF BLOOD VESSEL OF LEFT LITTLE FINGER, INITIAL ENCOUNTER: Primary | ICD-10-CM

## 2024-11-21 NOTE — PROGRESS NOTES
OCCUPATIONAL THERAPY DAILY NOTE  [facility] PHYSICIANS [place] SPECIALTY CARE [facility] OCCUPATIONAL THERAPY  [address]  2[address] FLOOR  [facility] [address]  Dept: [phone]  Loc: [phone]   [facility] OT Fax: [phone]      Date:  2024    Initial Evaluation Date: 10/29/24                              Evaluating Therapist: Amari Suarez OT     Patient Name:  Marianna Whipple                  :  1955     Restrictions/Precautions:  per protocol, low fall risk  Diagnosis:    Diagnosis   S65.517A (ICD-10-CM) - Laceration of blood vessel of left little finger, initial encounter                                                                 Date of Surgery/Injury: Oct 7th lacerated L small finger tip     Insurance/Certification information: [facility]  12 visits until 24  Plan of care signed (Y/N): electronic signed     Visit# / total visits:  approved [facility]  Referring Practitioner:      James Cook DO      Specific Practitioner Orders: eval and treat.      Assessment of current deficits   [x] Functional mobility             [x] ADLs           [x] Strength                  [] Cognition   [] Functional transfers           [x] IADLs          [x] Safety Awareness  [x] Endurance   [] Fine Motor Coordination    [] Balance      [] Vision/perception    [x] Sensation     [] Gross Motor Coordination [x] ROM           [x] Pain                        [] Edema          [x] Scar Adhesion/Skin Integrity      OT PLAN OF CARE   OT POC based on physician orders, patient diagnosis and results of clinical assessment  Frequency/Duration  2x week for 12 visits. Certification period: from 10/29/24 to 24  Reassessment date:                                 GOALS (Long term same as Short term):  1) Patient will demonstrate good understanding of home program (exercises/activities/diagnosis/prognosis/goals) with good accuracy.   2) Patient will demonstrate increased

## 2024-11-25 ENCOUNTER — TREATMENT (OUTPATIENT)
Dept: OCCUPATIONAL THERAPY | Age: 69
End: 2024-11-25

## 2024-11-25 DIAGNOSIS — S65.517A LACERATION OF BLOOD VESSEL OF LEFT LITTLE FINGER, INITIAL ENCOUNTER: Primary | ICD-10-CM

## 2024-11-25 NOTE — PROGRESS NOTES
OCCUPATIONAL THERAPY DAILY NOTE  [facility] PHYSICIANS [place] SPECIALTY CARE [facility] OCCUPATIONAL THERAPY  [address]  2[address] FLOOR  [facility] [address]  Dept: [phone]  Loc: [phone]   [facility] OT Fax: [phone]      Date:  2024    Initial Evaluation Date: 10/29/24                              Evaluating Therapist: Amari Suarez OT     Patient Name:  Marianna Whipple                  :  1955     Restrictions/Precautions:  per protocol, low fall risk  Diagnosis:    Diagnosis   S65.517A (ICD-10-CM) - Laceration of blood vessel of left little finger, initial encounter                                                                 Date of Surgery/Injury: Oct 7th lacerated L small finger tip     Insurance/Certification information: [facility]  12 visits until 24  Plan of care signed (Y/N): electronic signed     Visit# / total visits:  approved [facility]  Referring Practitioner:      James Cook DO      Specific Practitioner Orders: eval and treat.      Assessment of current deficits   [x] Functional mobility             [x] ADLs           [x] Strength                  [] Cognition   [] Functional transfers           [x] IADLs          [x] Safety Awareness  [x] Endurance   [] Fine Motor Coordination    [] Balance      [] Vision/perception    [x] Sensation     [] Gross Motor Coordination [x] ROM           [x] Pain                        [] Edema          [x] Scar Adhesion/Skin Integrity      OT PLAN OF CARE   OT POC based on physician orders, patient diagnosis and results of clinical assessment  Frequency/Duration  2x week for 12 visits. Certification period: from 10/29/24 to 24  Reassessment date:                                 GOALS (Long term same as Short term):  1) Patient will demonstrate good understanding of home program (exercises/activities/diagnosis/prognosis/goals) with good accuracy.   2) Patient will demonstrate increased

## 2024-12-02 ENCOUNTER — TREATMENT (OUTPATIENT)
Dept: OCCUPATIONAL THERAPY | Age: 69
End: 2024-12-02
Payer: MEDICARE

## 2024-12-02 DIAGNOSIS — S65.517A LACERATION OF BLOOD VESSEL OF LEFT LITTLE FINGER, INITIAL ENCOUNTER: Primary | ICD-10-CM

## 2024-12-02 PROCEDURE — 97110 THERAPEUTIC EXERCISES: CPT

## 2024-12-02 PROCEDURE — 97022 WHIRLPOOL THERAPY: CPT

## 2024-12-02 PROCEDURE — 97140 MANUAL THERAPY 1/> REGIONS: CPT

## 2024-12-02 NOTE — PROGRESS NOTES
OCCUPATIONAL THERAPY DAILY NOTE  [facility] PHYSICIANS [place] SPECIALTY CARE [facility] OCCUPATIONAL THERAPY  [address]  2ND FLOOR  [facility] [address]  Dept: [phone]  Loc: [phone]   [facility] OT Fax: [phone]      Date:  2024    Initial Evaluation Date: 10/29/24                              Evaluating Therapist: Amari Suarez OT     Patient Name:  Marianna Whipple                  :  1955     Restrictions/Precautions:  per protocol, low fall risk  Diagnosis:    Diagnosis   S65.517A (ICD-10-CM) - Laceration of blood vessel of left little finger, initial encounter                                                                 Date of Surgery/Injury: Oct 7th lacerated L small finger tip     Insurance/Certification information: [facility]  12 visits until 24  Plan of care signed (Y/N): electronic signed     Visit# / total visits:  approved [facility]  Referring Practitioner:      James Cook DO      Specific Practitioner Orders: eval and treat.      Assessment of current deficits   [x] Functional mobility             [x] ADLs           [x] Strength                  [] Cognition   [] Functional transfers           [x] IADLs          [x] Safety Awareness  [x] Endurance   [] Fine Motor Coordination    [] Balance      [] Vision/perception    [x] Sensation     [] Gross Motor Coordination [x] ROM           [x] Pain                        [] Edema          [x] Scar Adhesion/Skin Integrity      OT PLAN OF CARE   OT POC based on physician orders, patient diagnosis and results of clinical assessment  Frequency/Duration  2x week for 12 visits. Certification period: from 10/29/24 to 24  Reassessment date:                          GOALS (Long term same as Short term):  1) Patient will demonstrate good understanding of home program (exercises/activities/diagnosis/prognosis/goals) with good accuracy.   2) Patient will demonstrate increased active/passive range

## 2024-12-05 ENCOUNTER — TREATMENT (OUTPATIENT)
Dept: OCCUPATIONAL THERAPY | Age: 69
End: 2024-12-05

## 2024-12-05 DIAGNOSIS — S65.517A LACERATION OF BLOOD VESSEL OF LEFT LITTLE FINGER, INITIAL ENCOUNTER: Primary | ICD-10-CM

## 2024-12-05 NOTE — PROGRESS NOTES
active/passive range of motion of their affected hand/UE to [facility] for ADL/IADL completion.  3) Patient will demonstrate increased /pinch strength of at least 5 / 2 pinch pounds of their affected hand/UE.   4) Patient to report decreased pain in their affected hand/ upper extremity from 0-7/10 to 0-2/10 or less with functional use.   5) Patient to report a decrease in hypersensitivity from 50% to 20% or less in their affected hand/UE   6) Patient will be knowledgeable of edema control techniques as evident with decreases from mild to none.   7) Patient will demonstrate a non-tender/non-adherent scar.   8) Patient will report ADL functions as Mod I/I using affected hand/UE   8) Patient will decrease QuickDASH score to 15% or less for increased participation in daily functional activities.   TODAY'S TREATMENT     Pain Level: 0-6 on scale of 1-10, sharp and shooting    Subjective:  \"Doctor is keeping my off until January, not quite ready to return to work.   Objective:    Updated POC to be completed by 10th visit.     INTERVENTION: COMPLETED: SPECIFICS/COMMENTS:   Modality:     fluido x 10 min to increase tissue mobility/ desensitization        AROM:     L small finger X  X  X  x Active ext /flex 15x2  Tendon gliding 10x  Bean grasp for rom and desensitization  Small peg activity        AAROM:               PROM/Stretching:               Scar Mass/Edema Control:     Soft tissue mobilization x Left small finger tip/desensitization rubbing, massaging.         Strengthening:     Yellow digiflex x 3 min   Clothespins x Using the small finger and ring finger and thumb pinch.    gripper x Gripper with pom poms,, 3 bands.    putty x Green roll and peg removal   Other:     hep x Tendon glides, desensitization rubbing different textures on finger tip small finger Left          Assessment/Comments: Patient not feeling ready to RTW activities.  Progressing with strength and endurance activities well. Pt. Only has two more

## 2024-12-09 ENCOUNTER — TREATMENT (OUTPATIENT)
Dept: OCCUPATIONAL THERAPY | Age: 69
End: 2024-12-09
Payer: MEDICARE

## 2024-12-09 DIAGNOSIS — S65.517A LACERATION OF BLOOD VESSEL OF LEFT LITTLE FINGER, INITIAL ENCOUNTER: Primary | ICD-10-CM

## 2024-12-09 PROCEDURE — 97022 WHIRLPOOL THERAPY: CPT | Performed by: OCCUPATIONAL THERAPIST

## 2024-12-09 PROCEDURE — 97110 THERAPEUTIC EXERCISES: CPT | Performed by: OCCUPATIONAL THERAPIST

## 2024-12-09 PROCEDURE — 97140 MANUAL THERAPY 1/> REGIONS: CPT | Performed by: OCCUPATIONAL THERAPIST

## 2024-12-09 NOTE — PROGRESS NOTES
OCCUPATIONAL THERAPY DAILY NOTE/STATUS UPDATE  [facility] PHYSICIANS [place] SPECIALTY CARE [facility] OCCUPATIONAL THERAPY  [address]  2[address] FLO[address]  [facility] [address]  Dept: [phone]  Loc: [phone]   [facility] OT Fax: [phone]      Date:  2024    Initial Evaluation Date: 10/29/24                              Evaluating Therapist: Amari Suarez OT     Patient Name:  Marianna Whipple                  :  1955     Restrictions/Precautions:  per protocol, low fall risk  Diagnosis:    Diagnosis   S65.517A (ICD-10-CM) - Laceration of blood vessel of left little finger, initial encounter                                                                 Date of Surgery/Injury: Oct 7th lacerated L small finger tip     Insurance/Certification information: [facility]  12 visits until 24  Plan of care signed (Y/N): electronic signed     Visit# / total visits: +12= 24 approved [facility]  end date extended to 1-15-24  Referring Practitioner:      James Cook DO      Specific Practitioner Orders: eval and treat.      Assessment of current deficits   [x] Functional mobility             [x] ADLs           [x] Strength                  [] Cognition   [] Functional transfers           [x] IADLs          [x] Safety Awareness  [x] Endurance   [] Fine Motor Coordination    [] Balance      [] Vision/perception    [x] Sensation     [] Gross Motor Coordination [x] ROM           [x] Pain                        [] Edema          [x] Scar Adhesion/Skin Integrity      OT PLAN OF CARE   OT POC based on physician orders, patient diagnosis and results of clinical assessment  Frequency/Duration  2x week for 12 visits. Certification period: from 10/29/24 to 24  Reassessment date:                          GOALS (Long term same as Short term):  24  1) Patient will demonstrate good understanding of home program (exercises/activities/diagnosis/prognosis/goals) with good accuracy.

## 2024-12-12 ENCOUNTER — TREATMENT (OUTPATIENT)
Dept: OCCUPATIONAL THERAPY | Age: 69
End: 2024-12-12

## 2024-12-12 DIAGNOSIS — S65.517A LACERATION OF BLOOD VESSEL OF LEFT LITTLE FINGER, INITIAL ENCOUNTER: Primary | ICD-10-CM

## 2024-12-12 NOTE — PROGRESS NOTES
OCCUPATIONAL THERAPY DAILY NOTE  [facility] PHYSICIANS [place] SPECIALTY CARE [facility] OCCUPATIONAL THERAPY  [address]  2ND FLOOR  [facility] [address]  Dept: [phone]  Loc: [phone]   [facility] OT Fax: [phone]      Date:  2024    Initial Evaluation Date: 10/29/24                              Evaluating Therapist: Amari Suarez OT     Patient Name:  Marianna Whipple                  :  1955     Restrictions/Precautions:  per protocol, low fall risk  Diagnosis:    Diagnosis   S65.517A (ICD-10-CM) - Laceration of blood vessel of left little finger, initial encounter                                                                 Date of Surgery/Injury: Oct 7th lacerated L small finger tip     Insurance/Certification information: [facility]  12 visits until 24  Plan of care signed (Y/N): electronic signed     Visit# / total visits: +12= 24 approved [facility]  end date extended to 1-15-24  Referring Practitioner:      James Cook DO      Specific Practitioner Orders: eval and treat.      Assessment of current deficits   [x] Functional mobility             [x] ADLs           [x] Strength                  [] Cognition   [] Functional transfers           [x] IADLs          [x] Safety Awareness  [x] Endurance   [] Fine Motor Coordination    [] Balance      [] Vision/perception    [x] Sensation     [] Gross Motor Coordination [x] ROM           [x] Pain                        [] Edema          [x] Scar Adhesion/Skin Integrity      OT PLAN OF CARE   OT POC based on physician orders, patient diagnosis and results of clinical assessment  Frequency/Duration  2x week for 12 visits. Certification period: from 10/29/24 to 24  Reassessment date:                          GOALS (Long term same as Short term):  24  1) Patient will demonstrate good understanding of home program (exercises/activities/diagnosis/prognosis/goals) with good accuracy. PROGRESSING

## 2024-12-17 ENCOUNTER — TREATMENT (OUTPATIENT)
Dept: OCCUPATIONAL THERAPY | Age: 69
End: 2024-12-17
Payer: MEDICARE

## 2024-12-17 DIAGNOSIS — S65.517A LACERATION OF BLOOD VESSEL OF LEFT LITTLE FINGER, INITIAL ENCOUNTER: Primary | ICD-10-CM

## 2024-12-17 PROCEDURE — 97110 THERAPEUTIC EXERCISES: CPT | Performed by: OCCUPATIONAL THERAPIST

## 2024-12-17 PROCEDURE — 97022 WHIRLPOOL THERAPY: CPT | Performed by: OCCUPATIONAL THERAPIST

## 2024-12-17 PROCEDURE — 97140 MANUAL THERAPY 1/> REGIONS: CPT | Performed by: OCCUPATIONAL THERAPIST

## 2024-12-17 NOTE — PROGRESS NOTES
OCCUPATIONAL THERAPY DAILY NOTE  [facility] PHYSICIANS [place] SPECIALTY CARE [facility] OCCUPATIONAL THERAPY  [address]  2ND FLOOR  [facility] [address]  Dept: [phone]  Loc: [phone]   [facility] OT Fax: [phone]      Date:  2024    Initial Evaluation Date: 10/29/24                              Evaluating Therapist: Amari Suarez OT     Patient Name:  Marianna Whipple                  :  1955     Restrictions/Precautions:  per protocol, low fall risk  Diagnosis:    Diagnosis   S65.517A (ICD-10-CM) - Laceration of blood vessel of left little finger, initial encounter                                                                 Date of Surgery/Injury: Oct 7th lacerated L small finger tip     Insurance/Certification information: [facility]  12 visits until 24  Plan of care signed (Y/N): electronic signed     Visit# / total visits: +12= 24 approved [facility]  end date extended to 1-15-24  Referring Practitioner:      James Cook DO      Specific Practitioner Orders: eval and treat.      Assessment of current deficits   [x] Functional mobility             [x] ADLs           [x] Strength                  [] Cognition   [] Functional transfers           [x] IADLs          [x] Safety Awareness  [x] Endurance   [] Fine Motor Coordination    [] Balance      [] Vision/perception    [x] Sensation     [] Gross Motor Coordination [x] ROM           [x] Pain                        [] Edema          [x] Scar Adhesion/Skin Integrity      OT PLAN OF CARE   OT POC based on physician orders, patient diagnosis and results of clinical assessment  Frequency/Duration  2x week for 12 visits. Certification period: from 10/29/24 to 24  Reassessment date:                          GOALS (Long term same as Short term):  24  1) Patient will demonstrate good understanding of home program (exercises/activities/diagnosis/prognosis/goals) with good accuracy. PROGRESSING

## 2024-12-19 ENCOUNTER — TREATMENT (OUTPATIENT)
Dept: OCCUPATIONAL THERAPY | Age: 69
End: 2024-12-19

## 2024-12-19 DIAGNOSIS — S65.517A LACERATION OF BLOOD VESSEL OF LEFT LITTLE FINGER, INITIAL ENCOUNTER: Primary | ICD-10-CM

## 2024-12-19 NOTE — PROGRESS NOTES
Iontophoresis     07605 Therapeutic Ex 30 2   90154 Therapeutic Activity     74433 Neuromuscular Re-Ed     31608 Manual Therapy 15 1   08867 ADL/COMP Tech Train     78562 Orthotic Management/Training      Other                 Total  55 4       Plan: OT 2/week for 12 sessions    [x]  Continues Plan of care with focus on rom, desensitization, sensation, strength, I with ADL's, I with hep. : Treatment covered based on POC and graduated to patient's progress. Pt education continues at each visit to obtain maximum benefits from skilled OT intervention.  []  Alter Plan of care:   []  Discharge:      PHILL Pearce /SASHA, [id number]

## 2024-12-23 ENCOUNTER — TREATMENT (OUTPATIENT)
Dept: OCCUPATIONAL THERAPY | Age: 69
End: 2024-12-23
Payer: MEDICARE

## 2024-12-23 DIAGNOSIS — S65.517A LACERATION OF BLOOD VESSEL OF LEFT LITTLE FINGER, INITIAL ENCOUNTER: Primary | ICD-10-CM

## 2024-12-23 PROCEDURE — 97022 WHIRLPOOL THERAPY: CPT

## 2024-12-23 PROCEDURE — 97530 THERAPEUTIC ACTIVITIES: CPT

## 2024-12-23 PROCEDURE — 97110 THERAPEUTIC EXERCISES: CPT

## 2024-12-23 NOTE — PROGRESS NOTES
OCCUPATIONAL THERAPY DAILY NOTE  [facility] PHYSICIANS [place] SPECIALTY CARE [facility] OCCUPATIONAL THERAPY  [address]  2ND FLO[address]  [facility] [address]  Dept: [phone]  Loc: [phone]   [facility] OT Fax: [phone]      Date:  2024    Initial Evaluation Date: 10/29/24                              Evaluating Therapist: Amari Suarez OT     Patient Name:  Marianna Whipple                  :  1955     Restrictions/Precautions:  per protocol, low fall risk  Diagnosis:    Diagnosis   S65.517A (ICD-10-CM) - Laceration of blood vessel of left little finger, initial encounter                                                                 Date of Surgery/Injury: Oct 7th lacerated L small finger tip     Insurance/Certification information: [facility]  12 visits until 24  Plan of care signed (Y/N): electronic signed     Visit# / total visits: +12= 24 approved [facility]  end date extended to 1-15-24  Referring Practitioner:  James Cook DO        Specific Practitioner Orders: eval and treat.      Assessment of current deficits   [x] Functional mobility             [x] ADLs           [x] Strength                  [] Cognition   [] Functional transfers           [x] IADLs          [x] Safety Awareness  [x] Endurance   [] Fine Motor Coordination    [] Balance      [] Vision/perception    [x] Sensation     [] Gross Motor Coordination [x] ROM           [x] Pain                        [] Edema          [x] Scar Adhesion/Skin Integrity      OT PLAN OF CARE   OT POC based on physician orders, patient diagnosis and results of clinical assessment  Frequency/Duration  2x week for 12 visits. Certification period: from 10/29/24 to 24  Reassessment date:                          GOALS (Long term same as Short term):  24  1) Patient will demonstrate good understanding of home program (exercises/activities/diagnosis/prognosis/goals) with good accuracy. PROGRESSING

## 2024-12-26 ENCOUNTER — TREATMENT (OUTPATIENT)
Dept: OCCUPATIONAL THERAPY | Age: 69
End: 2024-12-26
Payer: MEDICARE

## 2024-12-26 DIAGNOSIS — S65.517A LACERATION OF BLOOD VESSEL OF LEFT LITTLE FINGER, INITIAL ENCOUNTER: Primary | ICD-10-CM

## 2024-12-26 PROCEDURE — 97530 THERAPEUTIC ACTIVITIES: CPT | Performed by: OCCUPATIONAL THERAPIST

## 2024-12-26 PROCEDURE — 97110 THERAPEUTIC EXERCISES: CPT | Performed by: OCCUPATIONAL THERAPIST

## 2024-12-26 PROCEDURE — 97022 WHIRLPOOL THERAPY: CPT | Performed by: OCCUPATIONAL THERAPIST

## 2024-12-26 NOTE — PROGRESS NOTES
OCCUPATIONAL THERAPY DAILY NOTE  [facility] PHYSICIANS [place] SPECIALTY CARE [facility] OCCUPATIONAL THERAPY  [address]  2ND FLO[address]  [facility] [address]  Dept: [phone]  Loc: [phone]   [facility] OT Fax: [phone]      Date:  2024    Initial Evaluation Date: 10/29/24                              Evaluating Therapist: Amari Suarez OT     Patient Name:  Marianna Whipple                  :  1955     Restrictions/Precautions:  per protocol, low fall risk  Diagnosis:    Diagnosis   S65.517A (ICD-10-CM) - Laceration of blood vessel of left little finger, initial encounter                                                                 Date of Surgery/Injury: Oct 7th lacerated L small finger tip     Insurance/Certification information: [facility]  12 visits until 24  Plan of care signed (Y/N): electronic signed     Visit# / total visits: +12= 24 approved [facility]  end date extended to 1-15-24  Referring Practitioner:  James Cook DO        Specific Practitioner Orders: eval and treat.      Assessment of current deficits   [x] Functional mobility             [x] ADLs           [x] Strength                  [] Cognition   [] Functional transfers           [x] IADLs          [x] Safety Awareness  [x] Endurance   [] Fine Motor Coordination    [] Balance      [] Vision/perception    [x] Sensation     [] Gross Motor Coordination [x] ROM           [x] Pain                        [] Edema          [x] Scar Adhesion/Skin Integrity      OT PLAN OF CARE   OT POC based on physician orders, patient diagnosis and results of clinical assessment  Frequency/Duration  2x week for 12 visits. Certification period: from 10/29/24 to 24  Reassessment date:                          GOALS (Long term same as Short term):  24  1) Patient will demonstrate good understanding of home program (exercises/activities/diagnosis/prognosis/goals) with good accuracy. PROGRESSING

## 2024-12-30 ENCOUNTER — TREATMENT (OUTPATIENT)
Dept: OCCUPATIONAL THERAPY | Age: 69
End: 2024-12-30
Payer: MEDICARE

## 2024-12-30 DIAGNOSIS — S65.517A LACERATION OF BLOOD VESSEL OF LEFT LITTLE FINGER, INITIAL ENCOUNTER: Primary | ICD-10-CM

## 2024-12-30 PROCEDURE — 97530 THERAPEUTIC ACTIVITIES: CPT | Performed by: OCCUPATIONAL THERAPIST

## 2024-12-30 PROCEDURE — 97022 WHIRLPOOL THERAPY: CPT | Performed by: OCCUPATIONAL THERAPIST

## 2024-12-30 PROCEDURE — 97110 THERAPEUTIC EXERCISES: CPT | Performed by: OCCUPATIONAL THERAPIST

## 2024-12-30 NOTE — PROGRESS NOTES
OCCUPATIONAL THERAPY DAILY NOTE  [facility] PHYSICIANS [place] SPECIALTY CARE [facility] OCCUPATIONAL THERAPY  [address]  [address]LO[address]  [facility] [address]  Dept: [phone]  Loc: [phone]   [facility] OT Fax: [phone]      Date:  2024    Initial Evaluation Date: 10/29/24                              Evaluating Therapist: Amari Suarez OT     Patient Name:  Marianna Whipple                  :  1955     Restrictions/Precautions:  per protocol, low fall risk  Diagnosis:    Diagnosis   S65.517A (ICD-10-CM) - Laceration of blood vessel of left little finger, initial encounter                                                                 Date of Surgery/Injury: Oct 7th lacerated L small finger tip     Insurance/Certification information: [facility]  12 visits until 24  Plan of care signed (Y/N): electronic signed     Visit# / total visits: 15 / 12+12= 24 approved [facility]  end date extended to 1-15-24  Referring Practitioner:  James Cook DO        Specific Practitioner Orders: eval and treat.      Assessment of current deficits   [x] Functional mobility             [x] ADLs           [x] Strength                  [] Cognition   [] Functional transfers           [x] IADLs          [x] Safety Awareness  [x] Endurance   [] Fine Motor Coordination    [] Balance      [] Vision/perception    [x] Sensation     [] Gross Motor Coordination [x] ROM           [x] Pain                        [] Edema          [x] Scar Adhesion/Skin Integrity      OT PLAN OF CARE   OT POC based on physician orders, patient diagnosis and results of clinical assessment  Frequency/Duration  2x week for 12 visits. Certification period: from 10/29/24 to 24  Reassessment date:                          GOALS (Long term same as Short term):  24  1) Patient will demonstrate good understanding of home program (exercises/activities/diagnosis/prognosis/goals) with good accuracy. PROGRESSING

## 2025-01-07 ENCOUNTER — TREATMENT (OUTPATIENT)
Dept: OCCUPATIONAL THERAPY | Age: 70
End: 2025-01-07
Payer: MEDICARE

## 2025-01-07 DIAGNOSIS — S65.517A LACERATION OF BLOOD VESSEL OF LEFT LITTLE FINGER, INITIAL ENCOUNTER: Primary | ICD-10-CM

## 2025-01-07 PROCEDURE — 97110 THERAPEUTIC EXERCISES: CPT | Performed by: OCCUPATIONAL THERAPIST

## 2025-01-07 PROCEDURE — 97022 WHIRLPOOL THERAPY: CPT | Performed by: OCCUPATIONAL THERAPIST

## 2025-01-07 PROCEDURE — 97530 THERAPEUTIC ACTIVITIES: CPT | Performed by: OCCUPATIONAL THERAPIST

## 2025-01-07 NOTE — PROGRESS NOTES
OCCUPATIONAL THERAPY DAILY NOTE  [facility] PHYSICIANS [place] SPECIALTY CARE [facility] OCCUPATIONAL THERAPY  [address]  2[address] FLO[address]  [facility] [address]  Dept: [phone]  Loc: [phone]   [facility] OT Fax: [phone]      Date:  2025    Initial Evaluation Date: 10/29/24                              Evaluating Therapist: Amari Suarez OT     Patient Name:  Marianna Whipple                  :  1955     Restrictions/Precautions:  per protocol, low fall risk  Diagnosis:    Diagnosis   S65.517A (ICD-10-CM) - Laceration of blood vessel of left little finger, initial encounter                                                                 Date of Surgery/Injury: Oct 7th lacerated L small finger tip     Insurance/Certification information: [facility]  12 visits until 1-15-25  Plan of care signed (Y/N): electronic signed     Visit# / total visits: +12= 24 approved [facility]  end date extended to 1-15-25  Referring Practitioner:  James Cook DO        Specific Practitioner Orders: eval and treat.      Assessment of current deficits   [x] Functional mobility             [x] ADLs           [x] Strength                  [] Cognition   [] Functional transfers           [x] IADLs          [x] Safety Awareness  [x] Endurance   [] Fine Motor Coordination    [] Balance      [] Vision/perception    [x] Sensation     [] Gross Motor Coordination [x] ROM           [x] Pain                        [] Edema          [x] Scar Adhesion/Skin Integrity      OT PLAN OF CARE   OT POC based on physician orders, patient diagnosis and results of clinical assessment  Frequency/Duration  2x week for 12 visits. Certification period: from 10/29/24 to 24  Reassessment date:                          GOALS (Long term same as Short term):  24  1) Patient will demonstrate good understanding of home program (exercises/activities/diagnosis/prognosis/goals) with good accuracy. PROGRESSING WELL.

## 2025-01-09 ENCOUNTER — TREATMENT (OUTPATIENT)
Dept: OCCUPATIONAL THERAPY | Age: 70
End: 2025-01-09

## 2025-01-09 NOTE — PROGRESS NOTES
OCCUPATIONAL THERAPY DAILY NOTE  [facility] PHYSICIANS [place] SPECIALTY CARE [facility] OCCUPATIONAL THERAPY  [address]  2[address] FLO[address]  [facility] [address]  Dept: [phone]  Loc: [phone]   [facility] OT Fax: [phone]      Date:  2025    Initial Evaluation Date: 10/29/24                              Evaluating Therapist: Amari Suarez OT     Patient Name:  Marianna Whipple                  :  1955     Restrictions/Precautions:  per protocol, low fall risk  Diagnosis:    Diagnosis   S65.517A (ICD-10-CM) - Laceration of blood vessel of left little finger, initial encounter                                                                 Date of Surgery/Injury: Oct 7th lacerated L small finger tip     Insurance/Certification information: [facility]  12 visits until 1-15-25  Plan of care signed (Y/N): electronic signed     Visit# / total visits: +12= 24 approved [facility]  end date extended to 1-15-25  Referring Practitioner:  James Cook DO        Specific Practitioner Orders: eval and treat.      Assessment of current deficits   [x] Functional mobility             [x] ADLs           [x] Strength                  [] Cognition   [] Functional transfers           [x] IADLs          [x] Safety Awareness  [x] Endurance   [] Fine Motor Coordination    [] Balance      [] Vision/perception    [x] Sensation     [] Gross Motor Coordination [x] ROM           [x] Pain                        [] Edema          [x] Scar Adhesion/Skin Integrity      OT PLAN OF CARE   OT POC based on physician orders, patient diagnosis and results of clinical assessment  Frequency/Duration  2x week for 12 visits. Certification period: from 10/29/24 to 24  Reassessment date:                          GOALS (Long term same as Short term):  24  1) Patient will demonstrate good understanding of home program (exercises/activities/diagnosis/prognosis/goals) with good accuracy. PROGRESSING WELL.

## 2025-06-17 ENCOUNTER — APPOINTMENT (OUTPATIENT)
Dept: GENERAL RADIOLOGY | Age: 70
End: 2025-06-17
Payer: COMMERCIAL

## 2025-06-17 ENCOUNTER — HOSPITAL ENCOUNTER (EMERGENCY)
Age: 70
Discharge: HOME | End: 2025-06-17
Attending: EMERGENCY MEDICINE
Payer: COMMERCIAL

## 2025-06-17 ENCOUNTER — APPOINTMENT (OUTPATIENT)
Dept: CT IMAGING | Age: 70
End: 2025-06-17
Payer: COMMERCIAL

## 2025-06-17 VITALS
BODY MASS INDEX: 21.61 KG/M2 | RESPIRATION RATE: 16 BRPM | TEMPERATURE: 98.2 F | OXYGEN SATURATION: 94 % | SYSTOLIC BLOOD PRESSURE: 130 MMHG | WEIGHT: 138 LBS | DIASTOLIC BLOOD PRESSURE: 80 MMHG | HEART RATE: 59 BPM
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
## 2025-06-17 DIAGNOSIS — M54.50 CHRONIC LOW BACK PAIN WITHOUT SCIATICA, UNSPECIFIED BACK PAIN LATERALITY: ICD-10-CM

## 2025-06-17 DIAGNOSIS — S70.02XA CONTUSION OF LEFT HIP, INITIAL ENCOUNTER: ICD-10-CM

## 2025-06-17 DIAGNOSIS — G89.29 CHRONIC LOW BACK PAIN WITHOUT SCIATICA, UNSPECIFIED BACK PAIN LATERALITY: ICD-10-CM

## 2025-06-17 PROCEDURE — 99284 EMERGENCY DEPT VISIT MOD MDM: CPT

## 2025-06-17 PROCEDURE — 6370000000 HC RX 637 (ALT 250 FOR IP): Performed by: EMERGENCY MEDICINE

## 2025-06-17 PROCEDURE — 71045 X-RAY EXAM CHEST 1 VIEW: CPT

## 2025-06-17 PROCEDURE — 96372 THER/PROPH/DIAG INJ SC/IM: CPT

## 2025-06-17 PROCEDURE — 72192 CT PELVIS W/O DYE: CPT

## 2025-06-17 PROCEDURE — 72131 CT LUMBAR SPINE W/O DYE: CPT

## 2025-06-17 PROCEDURE — 6360000002 HC RX W HCPCS: Performed by: EMERGENCY MEDICINE

## 2025-06-17 RX ORDER — FENTANYL CITRATE 50 UG/ML
25 INJECTION, SOLUTION INTRAMUSCULAR; INTRAVENOUS ONCE
Status: COMPLETED | OUTPATIENT
Start: 2025-06-17 | End: 2025-06-17

## 2025-06-17 RX ORDER — ONDANSETRON 4 MG/1
4 TABLET, ORALLY DISINTEGRATING ORAL ONCE
Status: COMPLETED | OUTPATIENT
Start: 2025-06-17 | End: 2025-06-17

## 2025-06-17 RX ADMIN — ONDANSETRON 4 MG: 4 TABLET, ORALLY DISINTEGRATING ORAL at 17:15

## 2025-06-17 RX ADMIN — FENTANYL CITRATE 25 MCG: 50 INJECTION INTRAMUSCULAR; INTRAVENOUS at 16:03

## 2025-06-17 ASSESSMENT — PAIN DESCRIPTION - ORIENTATION: ORIENTATION: LEFT

## 2025-06-17 ASSESSMENT — PAIN DESCRIPTION - LOCATION: LOCATION: HIP;BACK

## 2025-06-17 ASSESSMENT — LIFESTYLE VARIABLES
HOW MANY STANDARD DRINKS CONTAINING ALCOHOL DO YOU HAVE ON A TYPICAL DAY: PATIENT DOES NOT DRINK
HOW OFTEN DO YOU HAVE A DRINK CONTAINING ALCOHOL: NEVER

## 2025-06-17 ASSESSMENT — PAIN - FUNCTIONAL ASSESSMENT: PAIN_FUNCTIONAL_ASSESSMENT: 0-10

## 2025-06-17 ASSESSMENT — PAIN SCALES - GENERAL
PAINLEVEL_OUTOF10: 6
PAINLEVEL_OUTOF10: 2

## 2025-06-17 ASSESSMENT — PAIN DESCRIPTION - DESCRIPTORS: DESCRIPTORS: SORE

## 2025-06-17 NOTE — ED NOTES
Pt was ambulated without assistance. Pt had C/O pain in mid to lower back. Pt was able to walk to bathroom without difficulty.

## 2025-06-17 NOTE — ED PROVIDER NOTES
[facility] EMERGENCY DEPARTMENT  EMERGENCY DEPARTMENT ENCOUNTER        Pt Name: Marianna Whipple  MRN: [id number]  Birthdate 1955  Date of evaluation: 6/17/2025  Provider: Melyssa Frausto MD  PCP: Christina Ng DO  Note Started: 3:54 PM EDT 6/17/25    CHIEF COMPLAINT       Chief Complaint   Patient presents with    Hip Pain     Slipped and fell at work, has left hip pain       HISTORY OF PRESENT ILLNESS: 1 or more Elements   History From: Patient    Limitations to history : None    Marianna Whipple is a 69 y.o. female who presents to the emergency department after mechanical fall.  She states she slipped and fell at work.  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  She states she \"did the splits\".  Her left knee went backward and right leg went forward.  She was able to hold onto a shelf so should not fall fully on the ground.  She states she twisted awkwardly.  She has some chronic low back pain issues.  She denies any numbness or weakness lower extremities no bowel or bladder incontinence.  She has no focal low back tenderness at this time but most the pain is to the left hip and pelvic area.  She did not hit her head no loss of consciousness she is not on any blood thinners no chest pain or shortness of breath    Nursing Notes were all reviewed and agreed with or any disagreements were addressed in the HPI.      REVIEW OF EXTERNAL NOTE :    PDMP reviewed patient is chronically on gabapentin for      REVIEW OF SYSTEMS :           Positives and Pertinent negatives as per HPI.     SURGICAL HISTORY     Past Surgical History:   Procedure Laterality Date    LUMBAR SPINE SURGERY         CURRENTMEDICATIONS       Previous Medications    ALBUTEROL SULFATE HFA (VENTOLIN HFA) 108 (90 BASE) MCG/ACT INHALER    Inhale 2 puffs into the lungs every 6 hours as needed for Wheezing    ATORVASTATIN (LIPITOR) 10 MG TABLET    Take 1 tablet by mouth at  chronic back specialist I will give her a referral.  She has no back pain at this time.  Stable for discharge [KK]      ED Course User Index  [KK] Melyssa Frausto MD          CONSULTS: (Who and What was discussed)  None        I am the Primary Clinician of Record.    FINAL IMPRESSION      1. Fall, initial encounter    2. Contusion of left hip, initial encounter    3. Chronic low back pain without sciatica, unspecified back pain laterality          DISPOSITION/PLAN     DISPOSITION Decision To Discharge 06/17/2025 07:55:30 PM      PATIENT REFERRED TO:  Christina Ng,   [address] [address]  [phone]    Call in 1 day  for follow-up appointment    Alex Mcclendon DO  [address] [place] [address]  [phone]    Call in 2 days  for follow-up appointment      DISCHARGE MEDICATIONS:  New Prescriptions    No medications on file       DISCONTINUED MEDICATIONS:  Discontinued Medications    No medications on file              (Please note that portions of this note were completed with a voice recognition program.  Efforts were made to edit the dictations but occasionally words are mis-transcribed.)    Melyssa Frausto MD (electronically signed)            Melyssa Frausto MD  06/20/25 [id number]